# Patient Record
Sex: MALE | Race: WHITE | Employment: OTHER | URBAN - METROPOLITAN AREA
[De-identification: names, ages, dates, MRNs, and addresses within clinical notes are randomized per-mention and may not be internally consistent; named-entity substitution may affect disease eponyms.]

---

## 2018-08-10 ENCOUNTER — HOSPITAL ENCOUNTER (OUTPATIENT)
Dept: MRI IMAGING | Age: 78
Discharge: HOME | End: 2018-08-10
Attending: ORTHOPAEDIC SURGERY
Payer: MEDICARE

## 2018-08-10 DIAGNOSIS — M75.41 IMPINGEMENT SYNDROME OF RIGHT SHOULDER: ICD-10-CM

## 2018-08-10 PROCEDURE — 73221 MRI JOINT UPR EXTREM W/O DYE: CPT

## 2019-03-04 ENCOUNTER — HOSPITAL ENCOUNTER (OUTPATIENT)
Dept: PREADMISSION TESTING | Age: 79
Discharge: HOME | End: 2019-03-04
Payer: MEDICARE

## 2019-03-04 ENCOUNTER — HOSPITAL ENCOUNTER (OUTPATIENT)
Dept: NON INVASIVE DIAGNOSTICS | Age: 79
Discharge: HOME | End: 2019-03-04
Attending: ORTHOPAEDIC SURGERY
Payer: MEDICARE

## 2019-03-04 VITALS
BODY MASS INDEX: 29.14 KG/M2 | HEIGHT: 71 IN | WEIGHT: 208.11 LBS | DIASTOLIC BLOOD PRESSURE: 72 MMHG | RESPIRATION RATE: 17 BRPM | TEMPERATURE: 98.1 F | SYSTOLIC BLOOD PRESSURE: 138 MMHG | OXYGEN SATURATION: 97 %

## 2019-03-04 LAB
ABO + RH BLD: NORMAL
ALBUMIN SERPL-MCNC: 4 G/DL (ref 3.5–5)
ALBUMIN/GLOB SERPL: 1.5 {RATIO} (ref 1.1–2.2)
ALP SERPL-CCNC: 86 U/L (ref 45–117)
ALT SERPL-CCNC: 23 U/L (ref 12–78)
ANION GAP SERPL CALC-SCNC: 6 MMOL/L (ref 5–15)
APPEARANCE UR: CLEAR
APTT PPP: 27.5 SEC (ref 22.1–32)
AST SERPL-CCNC: 18 U/L (ref 15–37)
ATRIAL RATE: 576 BPM
BACTERIA URNS QL MICRO: NEGATIVE /HPF
BASOPHILS # BLD: 0 K/UL (ref 0–0.1)
BASOPHILS NFR BLD: 1 % (ref 0–1)
BILIRUB SERPL-MCNC: 0.5 MG/DL (ref 0.2–1)
BILIRUB UR QL: NEGATIVE
BLOOD GROUP ANTIBODIES SERPL: NORMAL
BUN SERPL-MCNC: 23 MG/DL (ref 6–20)
BUN/CREAT SERPL: 15 (ref 12–20)
CALCIUM SERPL-MCNC: 8.9 MG/DL (ref 8.5–10.1)
CALCULATED R AXIS, ECG10: 79 DEGREES
CALCULATED T AXIS, ECG11: 15 DEGREES
CHLORIDE SERPL-SCNC: 109 MMOL/L (ref 97–108)
CO2 SERPL-SCNC: 26 MMOL/L (ref 21–32)
COLOR UR: NORMAL
CREAT SERPL-MCNC: 1.57 MG/DL (ref 0.7–1.3)
DIAGNOSIS, 93000: NORMAL
DIFFERENTIAL METHOD BLD: NORMAL
EOSINOPHIL # BLD: 0.4 K/UL (ref 0–0.4)
EOSINOPHIL NFR BLD: 6 % (ref 0–7)
EPITH CASTS URNS QL MICRO: NORMAL /LPF
ERYTHROCYTE [DISTWIDTH] IN BLOOD BY AUTOMATED COUNT: 12.9 % (ref 11.5–14.5)
EST. AVERAGE GLUCOSE BLD GHB EST-MCNC: 108 MG/DL
GLOBULIN SER CALC-MCNC: 2.6 G/DL (ref 2–4)
GLUCOSE SERPL-MCNC: 88 MG/DL (ref 65–100)
GLUCOSE UR STRIP.AUTO-MCNC: NEGATIVE MG/DL
HBA1C MFR BLD: 5.4 % (ref 4.2–6.3)
HCT VFR BLD AUTO: 38.7 % (ref 36.6–50.3)
HGB BLD-MCNC: 12.8 G/DL (ref 12.1–17)
HGB UR QL STRIP: NEGATIVE
HYALINE CASTS URNS QL MICRO: NORMAL /LPF (ref 0–5)
IMM GRANULOCYTES # BLD AUTO: 0 K/UL (ref 0–0.04)
IMM GRANULOCYTES NFR BLD AUTO: 0 % (ref 0–0.5)
INR PPP: 1.1 (ref 0.9–1.1)
KETONES UR QL STRIP.AUTO: NEGATIVE MG/DL
LEUKOCYTE ESTERASE UR QL STRIP.AUTO: NEGATIVE
LYMPHOCYTES # BLD: 1.9 K/UL (ref 0.8–3.5)
LYMPHOCYTES NFR BLD: 30 % (ref 12–49)
MCH RBC QN AUTO: 29.9 PG (ref 26–34)
MCHC RBC AUTO-ENTMCNC: 33.1 G/DL (ref 30–36.5)
MCV RBC AUTO: 90.4 FL (ref 80–99)
MONOCYTES # BLD: 0.6 K/UL (ref 0–1)
MONOCYTES NFR BLD: 9 % (ref 5–13)
NEUTS SEG # BLD: 3.4 K/UL (ref 1.8–8)
NEUTS SEG NFR BLD: 55 % (ref 32–75)
NITRITE UR QL STRIP.AUTO: NEGATIVE
NRBC # BLD: 0 K/UL (ref 0–0.01)
NRBC BLD-RTO: 0 PER 100 WBC
P-R INTERVAL, ECG05: 312 MS
PH UR STRIP: 6 [PH] (ref 5–8)
PLATELET # BLD AUTO: 196 K/UL (ref 150–400)
PMV BLD AUTO: 10.2 FL (ref 8.9–12.9)
POTASSIUM SERPL-SCNC: 4.8 MMOL/L (ref 3.5–5.1)
PROT SERPL-MCNC: 6.6 G/DL (ref 6.4–8.2)
PROT UR STRIP-MCNC: NEGATIVE MG/DL
PROTHROMBIN TIME: 10.7 SEC (ref 9–11.1)
Q-T INTERVAL, ECG07: 412 MS
QRS DURATION, ECG06: 140 MS
QTC CALCULATION (BEZET), ECG08: 457 MS
RBC # BLD AUTO: 4.28 M/UL (ref 4.1–5.7)
RBC #/AREA URNS HPF: NORMAL /HPF (ref 0–5)
SODIUM SERPL-SCNC: 141 MMOL/L (ref 136–145)
SP GR UR REFRACTOMETRY: 1.02 (ref 1–1.03)
SPECIMEN EXP DATE BLD: NORMAL
THERAPEUTIC RANGE,PTTT: NORMAL SECS (ref 58–77)
UA: UC IF INDICATED,UAUC: NORMAL
UROBILINOGEN UR QL STRIP.AUTO: 1 EU/DL (ref 0.2–1)
VENTRICULAR RATE, ECG03: 74 BPM
WBC # BLD AUTO: 6.2 K/UL (ref 4.1–11.1)
WBC URNS QL MICRO: NORMAL /HPF (ref 0–4)

## 2019-03-04 PROCEDURE — 93005 ELECTROCARDIOGRAM TRACING: CPT

## 2019-03-04 PROCEDURE — 85025 COMPLETE CBC W/AUTO DIFF WBC: CPT

## 2019-03-04 PROCEDURE — 85730 THROMBOPLASTIN TIME PARTIAL: CPT

## 2019-03-04 PROCEDURE — 80053 COMPREHEN METABOLIC PANEL: CPT

## 2019-03-04 PROCEDURE — 36415 COLL VENOUS BLD VENIPUNCTURE: CPT

## 2019-03-04 PROCEDURE — 85610 PROTHROMBIN TIME: CPT

## 2019-03-04 PROCEDURE — 86900 BLOOD TYPING SEROLOGIC ABO: CPT

## 2019-03-04 PROCEDURE — 81001 URINALYSIS AUTO W/SCOPE: CPT

## 2019-03-04 PROCEDURE — 83036 HEMOGLOBIN GLYCOSYLATED A1C: CPT

## 2019-03-04 RX ORDER — HYDROCODONE BITARTRATE AND ACETAMINOPHEN 5; 325 MG/1; MG/1
1 TABLET ORAL
COMMUNITY
End: 2019-03-13

## 2019-03-04 RX ORDER — PYRIDOXINE HCL (VITAMIN B6) 100 MG
100 TABLET ORAL DAILY
COMMUNITY

## 2019-03-04 RX ORDER — ATORVASTATIN CALCIUM 40 MG/1
40 TABLET, FILM COATED ORAL
COMMUNITY

## 2019-03-04 RX ORDER — VALSARTAN 160 MG/1
160 TABLET ORAL DAILY
COMMUNITY

## 2019-03-04 RX ORDER — CARVEDILOL 3.12 MG/1
3.12 TABLET ORAL
COMMUNITY

## 2019-03-04 RX ORDER — ASPIRIN 325 MG
162.5 TABLET ORAL DAILY
COMMUNITY
End: 2019-03-13

## 2019-03-04 RX ORDER — ZOLPIDEM TARTRATE 10 MG/1
10 TABLET ORAL
COMMUNITY

## 2019-03-04 NOTE — PERIOP NOTES
[address] [place] [address] Dr [facility], [id number] [place] [address] MAIN OR                                  [phone] MAIN PRE OP                          [phone]                                                                                AMBULATORY PRE OP          [phone] PRE-ADMISSION TESTING    21  Surgery Date:   3/12/2019 Is surgery arrival time given by surgeon? NO If Elvis Alvarado staff will call you between 3 and 7pm the day before your surgery with your arrival time. (If your surgery is on a Monday, we will call you the Friday before.) Call [phone] after 7pm Monday-Friday if you did not receive your arrival time. INSTRUCTIONS BEFORE YOUR SURGERY When You 
Arrive Arrive at the 2nd [address] [facility] on the day of your surgery Have your insurance card, photo ID, and any copayment (if needed) Food 
 and  
Drink NO food or drink after midnight the night before surgery This means NO water, gum, mints, coffee, juice, etc. 
No alcohol (beer, wine, liquor) 24 hours before and after surgery Medications to TAKE Morning of Surgery MEDICATIONS TO TAKE THE MORNING OF SURGERY WITH A SIP OF WATER:  
? none Medications To 
STOP      7 days before surgery ? Non-Steroidal anti-inflammatory Drugs (NSAID's): for example, Ibuprofen (Advil, Motrin), Naproxen (Aleve) ? Aspirin, if taking for pain ? Herbal supplements, vitamins, and fish oil 
? Other: 
(Pain medications not listed above, including Tylenol may be taken) Blood Thinners ? If you take  Aspirin, Plavix, Coumadin, or any blood-thinning or anti-blood clot medicine, talk to the doctor who prescribed the medications for pre-operative instructions. ASK DR Eufemia Soria WHAT TO DO WITH YOUR ASA BEFORE SRUGERY Bathing Clothing Jewelry Valuables ?   If you shower the morning of surgery, please do not apply anything to your skin (lotions, powders, deodorant, or makeup, especially mascara) ? Follow all special bath instructions (for total joint replacement, spine and bowel surgeries) ? Do not shave or trim anywhere 24 hours before surgery ? Wear your hair loose or down; no pony-tails, buns, or metal hair clips ? Wear loose, comfortable, clean clothes ? Wear glasses instead of contacts ? Leave money, valuables, and jewelry, including body piercings, at home Going Home       or Spending the Night ? SAME-DAY SURGERY: You must have a responsible adult drive you home and stay with you 24 hours after surgery ? ADMITS: If your doctor is keeping you into the hospital after surgery, leave personal belongings/luggage in your car until you have a hospital room number. Hospital discharge time is 12 noon Drivers must be here before 12 noon unless you are told differently Special Instructions   Special Instructions: · Use Chlorhexidine Care Fusion wash and sponges 3 days prior to surgery as instructed. · Incentive spirometer given with instructions to practice at home and bring back to the hospital on the day of surgery. · [facility] will contact you if your Hemoglobin A1C is greater than 7.5. · Ensure/Glucerna  sample, nutritional information, and Ensure/Glucerna coupon given. · Pain pamphlet and Call Don't Fall reminder reviewed with patient. ·  parking is complimentary Monday - Friday 7 am - 5 pm 
· Bring PTA Medication list day of surgery with the last doses taken documented Follow all instructions so your surgery wont be cancelled. Please, be on time. If a situation occurs and you are delayed the day of surgery, call [phone]. If your physical condition changes (like a fever, cold, flu, etc.) call your surgeon. The patient was contacted  in person. Home medication reviewed and verified during PAT appointment. The patient verbalizes understanding of all instructions and does not  need reinforcement.

## 2019-03-04 NOTE — PERIOP NOTES
Phone call to Dr Jacque Pablo office to request ASA plan & clearance note Pacemaker plan faxed to Dr Jacque Pablo office Phone call to   [facility] office to request last office noted with [facility] for surgery Phone call to Dr Jennifer Christina office to request correct order for surgical procedure Pt stated he is having a right total knee replacement  Orders say left knee

## 2019-03-04 NOTE — PERIOP NOTES
Chlor-hex  wash  X 3 days before surgery reviewed, incentive spirometry/deep breathing, leg exercises to prevent DVT, s/s of infection all reviewed Pt verified understanding by teach back and return demonstration

## 2019-03-04 NOTE — PERIOP NOTES
Phone call:  HETALM for Edmond Bolden joint coordinator to get Mr Inna Smallwood scheduled into joint class for 3/6/2019   Pt will need called to confirm he his scheduled for this class

## 2019-03-05 LAB
BACTERIA SPEC CULT: NORMAL
BACTERIA SPEC CULT: NORMAL
SERVICE CMNT-IMP: NORMAL

## 2019-03-05 NOTE — PERIOP NOTES
Patient had requested to come to Joint Class on 2/6/19. Called patient to let him know he could come to class and patient stated he would not be able to make it.

## 2019-03-07 NOTE — PERIOP NOTES
Spoke with Dr. Rina Leal office. MD aware of pacer insertion on 2/14/19. New orders will be sent for RIGHT knee arthroplasty.

## 2019-03-08 NOTE — PERIOP NOTES
Call placed to Dr. Karla Núñez office to verify that pacemaker plan was received, pacemaker plan refaxed.

## 2019-03-11 ENCOUNTER — ANESTHESIA EVENT (OUTPATIENT)
Dept: SURGERY | Age: 79
DRG: 470 | End: 2019-03-11
Payer: MEDICARE

## 2019-03-11 NOTE — PERIOP NOTES
The corrected consent orders that were received from Dr. Margarito Majano office stated \"right total knee arthroscopy\" instead of \"arthroplasty. \"  Left a VM for Ericka Rowley at Dr. Margarito Majano office requesting corrected orders that state \"right total knee arthroplasty\" be faxed to PAT if before 1500 or to the Main preop if after 1500 today. Received the pacemaker plan and last cardiology OV note and placed on paper chart.   DOS: 3/12/2019

## 2019-03-12 ENCOUNTER — APPOINTMENT (OUTPATIENT)
Dept: GENERAL RADIOLOGY | Age: 79
DRG: 470 | End: 2019-03-12
Attending: ORTHOPAEDIC SURGERY
Payer: MEDICARE

## 2019-03-12 ENCOUNTER — HOSPITAL ENCOUNTER (INPATIENT)
Age: 79
LOS: 1 days | Discharge: HOME HEALTH | DRG: 470 | End: 2019-03-13
Attending: ORTHOPAEDIC SURGERY | Admitting: ORTHOPAEDIC SURGERY
Payer: MEDICARE

## 2019-03-12 ENCOUNTER — ANESTHESIA (OUTPATIENT)
Dept: SURGERY | Age: 79
DRG: 470 | End: 2019-03-12
Payer: MEDICARE

## 2019-03-12 DIAGNOSIS — M17.11 PRIMARY OSTEOARTHRITIS OF RIGHT KNEE: Primary | ICD-10-CM

## 2019-03-12 PROCEDURE — 76210000016 HC OR PH I REC 1 TO 1.5 HR: Performed by: ORTHOPAEDIC SURGERY

## 2019-03-12 PROCEDURE — 77030006835 HC BLD SAW SAG STRY -B: Performed by: ORTHOPAEDIC SURGERY

## 2019-03-12 PROCEDURE — 77030013708 HC HNDPC SUC IRR PULS STRY –B: Performed by: ORTHOPAEDIC SURGERY

## 2019-03-12 PROCEDURE — 77030020782 HC GWN BAIR PAWS FLX 3M -B

## 2019-03-12 PROCEDURE — 64447 NJX AA&/STRD FEMORAL NRV IMG: CPT | Performed by: ANESTHESIOLOGY

## 2019-03-12 PROCEDURE — 77030032490 HC SLV COMPR SCD KNE COVD -B

## 2019-03-12 PROCEDURE — 77030011640 HC PAD GRND REM COVD -A: Performed by: ORTHOPAEDIC SURGERY

## 2019-03-12 PROCEDURE — 76010000173 HC OR TIME 3 TO 3.5 HR INTENSV-TIER 1: Performed by: ORTHOPAEDIC SURGERY

## 2019-03-12 PROCEDURE — 74011250637 HC RX REV CODE- 250/637: Performed by: ANESTHESIOLOGY

## 2019-03-12 PROCEDURE — 77030036660

## 2019-03-12 PROCEDURE — 76060000037 HC ANESTHESIA 3 TO 3.5 HR: Performed by: ORTHOPAEDIC SURGERY

## 2019-03-12 PROCEDURE — 0SRC0J9 REPLACEMENT OF RIGHT KNEE JOINT WITH SYNTHETIC SUBSTITUTE, CEMENTED, OPEN APPROACH: ICD-10-PCS | Performed by: ORTHOPAEDIC SURGERY

## 2019-03-12 PROCEDURE — 77030018673: Performed by: ORTHOPAEDIC SURGERY

## 2019-03-12 PROCEDURE — 77030031139 HC SUT VCRL2 J&J -A: Performed by: ORTHOPAEDIC SURGERY

## 2019-03-12 PROCEDURE — 77030012935 HC DRSG AQUACEL BMS -B: Performed by: ORTHOPAEDIC SURGERY

## 2019-03-12 PROCEDURE — 74011250636 HC RX REV CODE- 250/636: Performed by: ORTHOPAEDIC SURGERY

## 2019-03-12 PROCEDURE — 77030021302 HC BUR RND FLUT BRSM -B: Performed by: ORTHOPAEDIC SURGERY

## 2019-03-12 PROCEDURE — 74011250637 HC RX REV CODE- 250/637: Performed by: ORTHOPAEDIC SURGERY

## 2019-03-12 PROCEDURE — 74011000250 HC RX REV CODE- 250

## 2019-03-12 PROCEDURE — 77030002933 HC SUT MCRYL J&J -A: Performed by: ORTHOPAEDIC SURGERY

## 2019-03-12 PROCEDURE — 77030010785: Performed by: ORTHOPAEDIC SURGERY

## 2019-03-12 PROCEDURE — 73560 X-RAY EXAM OF KNEE 1 OR 2: CPT

## 2019-03-12 PROCEDURE — 77030007866 HC KT SPN ANES BBMI -B: Performed by: ANESTHESIOLOGY

## 2019-03-12 PROCEDURE — 77010033678 HC OXYGEN DAILY

## 2019-03-12 PROCEDURE — 97116 GAIT TRAINING THERAPY: CPT

## 2019-03-12 PROCEDURE — 65660000000 HC RM CCU STEPDOWN

## 2019-03-12 PROCEDURE — 74011250636 HC RX REV CODE- 250/636: Performed by: ANESTHESIOLOGY

## 2019-03-12 PROCEDURE — 3E0T3BZ INTRODUCTION OF ANESTHETIC AGENT INTO PERIPHERAL NERVES AND PLEXI, PERCUTANEOUS APPROACH: ICD-10-PCS | Performed by: NURSE ANESTHETIST, CERTIFIED REGISTERED

## 2019-03-12 PROCEDURE — 77030003601 HC NDL NRV BLK BBMI -A: Performed by: ANESTHESIOLOGY

## 2019-03-12 PROCEDURE — 77030000032 HC CUF TRNQT ZIMM -B: Performed by: ORTHOPAEDIC SURGERY

## 2019-03-12 PROCEDURE — 74011000272 HC RX REV CODE- 272: Performed by: ORTHOPAEDIC SURGERY

## 2019-03-12 PROCEDURE — 74011250636 HC RX REV CODE- 250/636

## 2019-03-12 PROCEDURE — 77030033067 HC SUT PDO STRATFX SPIR J&J -B: Performed by: ORTHOPAEDIC SURGERY

## 2019-03-12 PROCEDURE — 74011000250 HC RX REV CODE- 250: Performed by: ORTHOPAEDIC SURGERY

## 2019-03-12 PROCEDURE — 77030039266 HC ADH SKN EXOFIN S2SG -A: Performed by: ORTHOPAEDIC SURGERY

## 2019-03-12 PROCEDURE — C1776 JOINT DEVICE (IMPLANTABLE): HCPCS | Performed by: ORTHOPAEDIC SURGERY

## 2019-03-12 PROCEDURE — C1713 ANCHOR/SCREW BN/BN,TIS/BN: HCPCS | Performed by: ORTHOPAEDIC SURGERY

## 2019-03-12 PROCEDURE — 97161 PT EVAL LOW COMPLEX 20 MIN: CPT

## 2019-03-12 PROCEDURE — 77030032490 HC SLV COMPR SCD KNE COVD -B: Performed by: ORTHOPAEDIC SURGERY

## 2019-03-12 PROCEDURE — 77030018836 HC SOL IRR NACL ICUM -A: Performed by: ORTHOPAEDIC SURGERY

## 2019-03-12 DEVICE — UNIVERSAL TIBIAL BASEPLATE
Type: IMPLANTABLE DEVICE | Site: KNEE | Status: FUNCTIONAL
Brand: TRIATHLON

## 2019-03-12 DEVICE — TIBIAL BEARING INSERT - CR
Type: IMPLANTABLE DEVICE | Site: KNEE | Status: FUNCTIONAL
Brand: TRIATHLON

## 2019-03-12 DEVICE — CRUCIATE RETAINING FEMORAL
Type: IMPLANTABLE DEVICE | Site: KNEE | Status: FUNCTIONAL
Brand: TRIATHLON

## 2019-03-12 DEVICE — CEMENT BNE 20ML 41GM FULL DOSE PMMA W/ TOBRA M VISC RADPQ: Type: IMPLANTABLE DEVICE | Site: KNEE | Status: FUNCTIONAL

## 2019-03-12 DEVICE — ASYMMETRIC PATELLA
Type: IMPLANTABLE DEVICE | Site: KNEE | Status: FUNCTIONAL
Brand: TRIATHLON

## 2019-03-12 DEVICE — COMPONENT KNEE CEM X3 TRIATHLON: Type: IMPLANTABLE DEVICE | Status: FUNCTIONAL

## 2019-03-12 RX ORDER — VANCOMYCIN HYDROCHLORIDE 1 G/20ML
INJECTION, POWDER, LYOPHILIZED, FOR SOLUTION INTRAVENOUS AS NEEDED
Status: DISCONTINUED | OUTPATIENT
Start: 2019-03-12 | End: 2019-03-12 | Stop reason: HOSPADM

## 2019-03-12 RX ORDER — PROPOFOL 10 MG/ML
INJECTION, EMULSION INTRAVENOUS AS NEEDED
Status: DISCONTINUED | OUTPATIENT
Start: 2019-03-12 | End: 2019-03-12 | Stop reason: HOSPADM

## 2019-03-12 RX ORDER — FLUMAZENIL 0.1 MG/ML
0.2 INJECTION INTRAVENOUS
Status: DISCONTINUED | OUTPATIENT
Start: 2019-03-12 | End: 2019-03-12 | Stop reason: HOSPADM

## 2019-03-12 RX ORDER — SODIUM CHLORIDE, SODIUM LACTATE, POTASSIUM CHLORIDE, CALCIUM CHLORIDE 600; 310; 30; 20 MG/100ML; MG/100ML; MG/100ML; MG/100ML
125 INJECTION, SOLUTION INTRAVENOUS CONTINUOUS
Status: DISCONTINUED | OUTPATIENT
Start: 2019-03-12 | End: 2019-03-12 | Stop reason: HOSPADM

## 2019-03-12 RX ORDER — AMOXICILLIN 250 MG
1 CAPSULE ORAL 2 TIMES DAILY
Status: DISCONTINUED | OUTPATIENT
Start: 2019-03-12 | End: 2019-03-13 | Stop reason: HOSPADM

## 2019-03-12 RX ORDER — FACIAL-BODY WIPES
10 EACH TOPICAL DAILY PRN
Status: DISCONTINUED | OUTPATIENT
Start: 2019-03-14 | End: 2019-03-13 | Stop reason: HOSPADM

## 2019-03-12 RX ORDER — SODIUM CHLORIDE 0.9 % (FLUSH) 0.9 %
5-40 SYRINGE (ML) INJECTION AS NEEDED
Status: DISCONTINUED | OUTPATIENT
Start: 2019-03-12 | End: 2019-03-13 | Stop reason: HOSPADM

## 2019-03-12 RX ORDER — MIDAZOLAM HYDROCHLORIDE 1 MG/ML
INJECTION, SOLUTION INTRAMUSCULAR; INTRAVENOUS AS NEEDED
Status: DISCONTINUED | OUTPATIENT
Start: 2019-03-12 | End: 2019-03-12 | Stop reason: HOSPADM

## 2019-03-12 RX ORDER — ASPIRIN 81 MG/1
81 TABLET ORAL 2 TIMES DAILY
Status: DISCONTINUED | OUTPATIENT
Start: 2019-03-12 | End: 2019-03-13 | Stop reason: HOSPADM

## 2019-03-12 RX ORDER — OXYCODONE HYDROCHLORIDE 5 MG/1
10 TABLET ORAL
Status: DISCONTINUED | OUTPATIENT
Start: 2019-03-12 | End: 2019-03-13 | Stop reason: HOSPADM

## 2019-03-12 RX ORDER — PROPOFOL 10 MG/ML
INJECTION, EMULSION INTRAVENOUS
Status: DISCONTINUED | OUTPATIENT
Start: 2019-03-12 | End: 2019-03-12 | Stop reason: HOSPADM

## 2019-03-12 RX ORDER — OXYCODONE HYDROCHLORIDE 5 MG/1
5 TABLET ORAL
Status: DISCONTINUED | OUTPATIENT
Start: 2019-03-12 | End: 2019-03-13 | Stop reason: HOSPADM

## 2019-03-12 RX ORDER — ZOLPIDEM TARTRATE 5 MG/1
5 TABLET ORAL
Status: DISCONTINUED | OUTPATIENT
Start: 2019-03-12 | End: 2019-03-13 | Stop reason: HOSPADM

## 2019-03-12 RX ORDER — POLYETHYLENE GLYCOL 3350 17 G/17G
17 POWDER, FOR SOLUTION ORAL DAILY
Status: DISCONTINUED | OUTPATIENT
Start: 2019-03-13 | End: 2019-03-13 | Stop reason: HOSPADM

## 2019-03-12 RX ORDER — ROPIVACAINE HYDROCHLORIDE 5 MG/ML
INJECTION, SOLUTION EPIDURAL; INFILTRATION; PERINEURAL AS NEEDED
Status: DISCONTINUED | OUTPATIENT
Start: 2019-03-12 | End: 2019-03-12 | Stop reason: HOSPADM

## 2019-03-12 RX ORDER — POVIDONE-IODINE 10 %
SOLUTION, NON-ORAL TOPICAL AS NEEDED
Status: DISCONTINUED | OUTPATIENT
Start: 2019-03-12 | End: 2019-03-12 | Stop reason: HOSPADM

## 2019-03-12 RX ORDER — DEXAMETHASONE SODIUM PHOSPHATE 4 MG/ML
4 INJECTION, SOLUTION INTRA-ARTICULAR; INTRALESIONAL; INTRAMUSCULAR; INTRAVENOUS; SOFT TISSUE EVERY 6 HOURS
Status: DISPENSED | OUTPATIENT
Start: 2019-03-12 | End: 2019-03-13

## 2019-03-12 RX ORDER — DIPHENHYDRAMINE HYDROCHLORIDE 50 MG/ML
12.5 INJECTION, SOLUTION INTRAMUSCULAR; INTRAVENOUS
Status: ACTIVE | OUTPATIENT
Start: 2019-03-12 | End: 2019-03-13

## 2019-03-12 RX ORDER — HYDROMORPHONE HYDROCHLORIDE 2 MG/ML
0.5 INJECTION, SOLUTION INTRAMUSCULAR; INTRAVENOUS; SUBCUTANEOUS
Status: ACTIVE | OUTPATIENT
Start: 2019-03-12 | End: 2019-03-13

## 2019-03-12 RX ORDER — ACETAMINOPHEN 325 MG/1
650 TABLET ORAL EVERY 6 HOURS
Status: DISCONTINUED | OUTPATIENT
Start: 2019-03-12 | End: 2019-03-13 | Stop reason: HOSPADM

## 2019-03-12 RX ORDER — LANOLIN ALCOHOL/MO/W.PET/CERES
100 CREAM (GRAM) TOPICAL DAILY
Status: DISCONTINUED | OUTPATIENT
Start: 2019-03-13 | End: 2019-03-13 | Stop reason: HOSPADM

## 2019-03-12 RX ORDER — CEFAZOLIN SODIUM/WATER 2 G/20 ML
2 SYRINGE (ML) INTRAVENOUS ONCE
Status: COMPLETED | OUTPATIENT
Start: 2019-03-12 | End: 2019-03-12

## 2019-03-12 RX ORDER — DIPHENHYDRAMINE HYDROCHLORIDE 50 MG/ML
12.5 INJECTION, SOLUTION INTRAMUSCULAR; INTRAVENOUS AS NEEDED
Status: DISCONTINUED | OUTPATIENT
Start: 2019-03-12 | End: 2019-03-12 | Stop reason: HOSPADM

## 2019-03-12 RX ORDER — LIDOCAINE HYDROCHLORIDE 10 MG/ML
0.1 INJECTION, SOLUTION EPIDURAL; INFILTRATION; INTRACAUDAL; PERINEURAL AS NEEDED
Status: DISCONTINUED | OUTPATIENT
Start: 2019-03-12 | End: 2019-03-12 | Stop reason: HOSPADM

## 2019-03-12 RX ORDER — FENTANYL CITRATE 50 UG/ML
INJECTION, SOLUTION INTRAMUSCULAR; INTRAVENOUS AS NEEDED
Status: DISCONTINUED | OUTPATIENT
Start: 2019-03-12 | End: 2019-03-12 | Stop reason: HOSPADM

## 2019-03-12 RX ORDER — BUPIVACAINE HYDROCHLORIDE 5 MG/ML
INJECTION, SOLUTION EPIDURAL; INTRACAUDAL AS NEEDED
Status: DISCONTINUED | OUTPATIENT
Start: 2019-03-12 | End: 2019-03-12 | Stop reason: HOSPADM

## 2019-03-12 RX ORDER — OXYCODONE HYDROCHLORIDE 5 MG/1
5 TABLET ORAL
Status: DISCONTINUED | OUTPATIENT
Start: 2019-03-12 | End: 2019-03-12 | Stop reason: HOSPADM

## 2019-03-12 RX ORDER — SODIUM CHLORIDE 0.9 % (FLUSH) 0.9 %
5-40 SYRINGE (ML) INJECTION EVERY 8 HOURS
Status: DISCONTINUED | OUTPATIENT
Start: 2019-03-12 | End: 2019-03-13 | Stop reason: HOSPADM

## 2019-03-12 RX ORDER — HYDROMORPHONE HYDROCHLORIDE 1 MG/ML
.25-1 INJECTION, SOLUTION INTRAMUSCULAR; INTRAVENOUS; SUBCUTANEOUS
Status: DISCONTINUED | OUTPATIENT
Start: 2019-03-12 | End: 2019-03-12 | Stop reason: HOSPADM

## 2019-03-12 RX ORDER — ATORVASTATIN CALCIUM 20 MG/1
40 TABLET, FILM COATED ORAL
Status: DISCONTINUED | OUTPATIENT
Start: 2019-03-12 | End: 2019-03-13 | Stop reason: HOSPADM

## 2019-03-12 RX ORDER — LABETALOL HYDROCHLORIDE 5 MG/ML
INJECTION, SOLUTION INTRAVENOUS AS NEEDED
Status: DISCONTINUED | OUTPATIENT
Start: 2019-03-12 | End: 2019-03-12 | Stop reason: HOSPADM

## 2019-03-12 RX ORDER — CARVEDILOL 3.12 MG/1
3.12 TABLET ORAL
Status: DISCONTINUED | OUTPATIENT
Start: 2019-03-12 | End: 2019-03-13 | Stop reason: HOSPADM

## 2019-03-12 RX ORDER — CEFAZOLIN SODIUM/WATER 2 G/20 ML
2 SYRINGE (ML) INTRAVENOUS EVERY 8 HOURS
Status: COMPLETED | OUTPATIENT
Start: 2019-03-12 | End: 2019-03-13

## 2019-03-12 RX ORDER — SODIUM CHLORIDE 9 MG/ML
125 INJECTION, SOLUTION INTRAVENOUS CONTINUOUS
Status: DISPENSED | OUTPATIENT
Start: 2019-03-12 | End: 2019-03-13

## 2019-03-12 RX ORDER — ONDANSETRON 2 MG/ML
4 INJECTION INTRAMUSCULAR; INTRAVENOUS
Status: ACTIVE | OUTPATIENT
Start: 2019-03-12 | End: 2019-03-13

## 2019-03-12 RX ORDER — NALOXONE HYDROCHLORIDE 0.4 MG/ML
0.2 INJECTION, SOLUTION INTRAMUSCULAR; INTRAVENOUS; SUBCUTANEOUS
Status: DISCONTINUED | OUTPATIENT
Start: 2019-03-12 | End: 2019-03-12 | Stop reason: HOSPADM

## 2019-03-12 RX ORDER — ACETAMINOPHEN 325 MG/1
975 TABLET ORAL ONCE
Status: COMPLETED | OUTPATIENT
Start: 2019-03-12 | End: 2019-03-12

## 2019-03-12 RX ORDER — NALOXONE HYDROCHLORIDE 0.4 MG/ML
0.4 INJECTION, SOLUTION INTRAMUSCULAR; INTRAVENOUS; SUBCUTANEOUS AS NEEDED
Status: DISCONTINUED | OUTPATIENT
Start: 2019-03-12 | End: 2019-03-13 | Stop reason: HOSPADM

## 2019-03-12 RX ORDER — FAMOTIDINE 20 MG/1
20 TABLET, FILM COATED ORAL 2 TIMES DAILY
Status: DISCONTINUED | OUTPATIENT
Start: 2019-03-12 | End: 2019-03-13 | Stop reason: HOSPADM

## 2019-03-12 RX ORDER — FENTANYL CITRATE 50 UG/ML
25 INJECTION, SOLUTION INTRAMUSCULAR; INTRAVENOUS
Status: DISCONTINUED | OUTPATIENT
Start: 2019-03-12 | End: 2019-03-12 | Stop reason: HOSPADM

## 2019-03-12 RX ADMIN — FAMOTIDINE 20 MG: 20 TABLET ORAL at 15:28

## 2019-03-12 RX ADMIN — LABETALOL HYDROCHLORIDE 10 MG: 5 INJECTION, SOLUTION INTRAVENOUS at 08:29

## 2019-03-12 RX ADMIN — OXYCODONE HYDROCHLORIDE 10 MG: 5 TABLET ORAL at 22:56

## 2019-03-12 RX ADMIN — MIDAZOLAM HYDROCHLORIDE 2 MG: 1 INJECTION, SOLUTION INTRAMUSCULAR; INTRAVENOUS at 07:08

## 2019-03-12 RX ADMIN — MIDAZOLAM HYDROCHLORIDE 2 MG: 1 INJECTION, SOLUTION INTRAMUSCULAR; INTRAVENOUS at 07:35

## 2019-03-12 RX ADMIN — ATORVASTATIN CALCIUM 40 MG: 20 TABLET, FILM COATED ORAL at 21:14

## 2019-03-12 RX ADMIN — LABETALOL HYDROCHLORIDE 5 MG: 5 INJECTION, SOLUTION INTRAVENOUS at 08:55

## 2019-03-12 RX ADMIN — DEXAMETHASONE SODIUM PHOSPHATE 4 MG: 4 INJECTION, SOLUTION INTRAMUSCULAR; INTRAVENOUS at 15:29

## 2019-03-12 RX ADMIN — MIDAZOLAM HYDROCHLORIDE 1 MG: 1 INJECTION, SOLUTION INTRAMUSCULAR; INTRAVENOUS at 07:10

## 2019-03-12 RX ADMIN — ROPIVACAINE HYDROCHLORIDE 30 ML: 5 INJECTION, SOLUTION EPIDURAL; INFILTRATION; PERINEURAL at 07:13

## 2019-03-12 RX ADMIN — PROPOFOL 75 MCG/KG/MIN: 10 INJECTION, EMULSION INTRAVENOUS at 07:30

## 2019-03-12 RX ADMIN — SENNOSIDES AND DOCUSATE SODIUM 1 TABLET: 8.6; 5 TABLET ORAL at 15:28

## 2019-03-12 RX ADMIN — SODIUM CHLORIDE, SODIUM LACTATE, POTASSIUM CHLORIDE, AND CALCIUM CHLORIDE: 600; 310; 30; 20 INJECTION, SOLUTION INTRAVENOUS at 08:45

## 2019-03-12 RX ADMIN — ACETAMINOPHEN 650 MG: 325 TABLET ORAL at 22:56

## 2019-03-12 RX ADMIN — Medication 2 G: at 15:28

## 2019-03-12 RX ADMIN — HYDROMORPHONE HYDROCHLORIDE 0.5 MG: 1 INJECTION, SOLUTION INTRAMUSCULAR; INTRAVENOUS; SUBCUTANEOUS at 11:16

## 2019-03-12 RX ADMIN — ACETAMINOPHEN 650 MG: 325 TABLET ORAL at 15:28

## 2019-03-12 RX ADMIN — CARVEDILOL 3.12 MG: 3.12 TABLET, FILM COATED ORAL at 21:14

## 2019-03-12 RX ADMIN — SODIUM CHLORIDE, SODIUM LACTATE, POTASSIUM CHLORIDE, AND CALCIUM CHLORIDE 125 ML/HR: 600; 310; 30; 20 INJECTION, SOLUTION INTRAVENOUS at 07:04

## 2019-03-12 RX ADMIN — Medication 2 G: at 21:15

## 2019-03-12 RX ADMIN — Medication 10 ML: at 14:00

## 2019-03-12 RX ADMIN — FENTANYL CITRATE 50 MCG: 50 INJECTION, SOLUTION INTRAMUSCULAR; INTRAVENOUS at 07:08

## 2019-03-12 RX ADMIN — PROPOFOL 20 MG: 10 INJECTION, EMULSION INTRAVENOUS at 07:34

## 2019-03-12 RX ADMIN — ASPIRIN 81 MG: 81 TABLET ORAL at 15:28

## 2019-03-12 RX ADMIN — HYDROMORPHONE HYDROCHLORIDE 0.5 MG: 1 INJECTION, SOLUTION INTRAMUSCULAR; INTRAVENOUS; SUBCUTANEOUS at 10:50

## 2019-03-12 RX ADMIN — Medication 2 G: at 07:36

## 2019-03-12 RX ADMIN — BUPIVACAINE HYDROCHLORIDE 2.2 ML: 5 INJECTION, SOLUTION EPIDURAL; INTRACAUDAL at 07:24

## 2019-03-12 RX ADMIN — DEXAMETHASONE SODIUM PHOSPHATE 4 MG: 4 INJECTION, SOLUTION INTRAMUSCULAR; INTRAVENOUS at 22:56

## 2019-03-12 RX ADMIN — FENTANYL CITRATE 50 MCG: 50 INJECTION, SOLUTION INTRAMUSCULAR; INTRAVENOUS at 08:12

## 2019-03-12 RX ADMIN — LABETALOL HYDROCHLORIDE 5 MG: 5 INJECTION, SOLUTION INTRAVENOUS at 08:11

## 2019-03-12 RX ADMIN — ACETAMINOPHEN 975 MG: 325 TABLET ORAL at 06:24

## 2019-03-12 RX ADMIN — OXYCODONE HYDROCHLORIDE 5 MG: 5 TABLET ORAL at 15:29

## 2019-03-12 NOTE — ANESTHESIA PROCEDURE NOTES
Peripheral Block    Start time: 3/12/2019 7:08 AM  End time: 3/12/2019 7:13 AM  Performed by: Nick Casas CRNA  Authorized by: Diane Moore MD       Pre-procedure: Indications: at surgeon's request and post-op pain management    Preanesthetic Checklist: patient identified, risks and benefits discussed, site marked, timeout performed, anesthesia consent given and patient being monitored    Timeout Time: 07:08          Block Type:   Block Type: Adductor canal  Laterality:  Right  Monitoring:  Continuous pulse ox, frequent vital sign checks, heart rate, responsive to questions and oxygen  Injection Technique:  Single shot  Procedures: ultrasound guided    Patient Position: supine  Prep: chlorhexidine    Needle Type:  Stimuplex  Needle Gauge:  21 G  Needle Localization:  Ultrasound guidance    Assessment:  Number of attempts:  1  Injection Assessment:  Incremental injection every 5 mL, local visualized surrounding nerve on ultrasound, negative aspiration for blood, no paresthesia and no intravascular symptoms  Patient tolerance:  Patient tolerated the procedure well with no immediate complications  Sciatic block performed with nerve stimulation and landmark identification. Needle used was 4\" stimuplex 21g. 20cc 0.2% ropivacaine injected intermittently with negative aspiration.

## 2019-03-12 NOTE — PROGRESS NOTES
Date of Surgery Update:  Malva Kanner was seen and examined. History and physical has been reviewed. The patient has been examined. There have been no significant clinical changes since the completion of the originally dated History and Physical.  Patient identified by surgeon; surgical site was confirmed by patient and surgeon. We will plan to admit the patient post op with telemetry monitoring for a likely 2 midnight admission after his recent pacemaker placement.      Signed By: Blake Zimmer MD     March 12, 2019 7:12 AM

## 2019-03-12 NOTE — PROGRESS NOTES
3/12/2019 5:36 PM Case management consult for discharge planning received. Met with pt. Charted address and phone number confirmed. Reason for Admission:  Right total knee arthroplasty with Dr. Bharath Rivero Score: 10                    Plan for utilizing home health:  Yes, choice given, [facility] [address] selected as preference. Referral sent to [facility] [address] via [employer]. Likelihood of Readmission: low                          Transition of Care Plan:  Home with family and home health    Pt lives with his wife in a 2 story home in [place]. Pt has a cane at home, choice given for rw dme agency, [facility] selected as preference. Rw order sent to [facility] via [employer]. Pt has rx coverage and fills his scripts at the [facility] [address]. Pt's wife will transport pt home. Pt was independent with adls and driving prior to admission. CM will follow.  REBECCA Zaidi  Care Management Interventions  PCP Verified by CM: Yes(Param Li, no nurse navigator )  Transition of Care Consult (CM Consult): Discharge Planning  MyChart Signup: No  Discharge Durable Medical Equipment: No  Physical Therapy Consult: Yes  Occupational Therapy Consult: Yes  Speech Therapy Consult: No  Current Support Network: Lives with Spouse, Own Home

## 2019-03-12 NOTE — ANESTHESIA POSTPROCEDURE EVALUATION
Procedure(s):  RIGHT CONVERSION TOTAL KNEE ARTHROPLASTY.     Anesthesia Post Evaluation        Patient location during evaluation: PACU  Level of consciousness: awake  Pain management: adequate  Airway patency: patent  Anesthetic complications: no  Cardiovascular status: acceptable  Respiratory status: acceptable  Hydration status: acceptable        Visit Vitals  /75   Pulse 78   Temp 36.5 °C (97.7 °F)   Resp 18   SpO2 95%

## 2019-03-12 NOTE — BRIEF OP NOTE
BRIEF OPERATIVE NOTE    Date of Procedure: 3/12/2019   Preoperative Diagnosis: Osteoarthritis of right knee  Postoperative Diagnosis: Osteoarthritis of right knee    Procedure(s):  RIGHT CONVERSION TOTAL KNEE ARTHROPLASTY  Surgeon(s) and Role:     Marisol Vasquez MD - Primary         Surgical Assistant: Almas Guzman    Surgical Staff:  Circ-1: Maru Galo RN  Circ-Relief: Gloria Melendez, Jose Eduardo Britt RN  Scrub Tech-1: Benji Peres  Scrub Tech-Relief: Leopoldo Lederer  Surg Asst-1: Lobojulien Roberson  Surg Asst-2: Lindsey Severance  Event Time In Time Out   Incision Start 8442    Incision Close 1028      Anesthesia: Other   Estimated Blood Loss: 150  Specimens: * No specimens in log *   Findings: Stable medial uni, end stage PF OA and lateral OA   Complications: none immediate  Implants:   Implant Name Type Inv.  Item Serial No.  Lot No. LRB No. Used Action   CEMENT BNE SIMPLEX TOBRA 4 --  - SN/A Cement CEMENT BNE SIMPLEX TOBRA 4 --  N/A [employer] HOW [id number] Right 2 Implanted   PAT ASYM TRIATHLN X3 [id number] -- TRIATHLON ASYMMETRIC X3 - SN/A Joint Component PAT ASYM TRIATHLN X3 [id number] -- TRIATHLON ASYMMETRIC X3 N/A [employer] HOW [id number] Right 1 Implanted   FEM KNE CATA CR SZ 5 RT -- TRIATHLON - SN/A Joint Component FEM KNE CATA CR SZ 5 RT -- TRIATHLON N/A [employer] HOW [id number] Right 1 Implanted   BASEPLT TIB UNIV TRIATHLN 5 --  - SN/A Joint Component BASEPLT TIB UNIV TRIATHLN 5 --  N/A [employer] HOW [id number] Right 1 Implanted   INSERT TIB ARTC CRUC SZ5 11MM --  - SN/A Joint Component INSERT TIB ARTC CRUC SZ5 11MM --  N/A [employer] HOW [id number] Right 1 Implanted

## 2019-03-12 NOTE — PROGRESS NOTES
Problem: Mobility Impaired (Adult and Pediatric)  Goal: *Acute Goals and Plan of Care (Insert Text)  Physical Therapy Goals  Initiated 3/12/2019    1. Patient will move from supine to sit and sit to supine  in bed with independence within 4 days. 2. Patient will perform sit to stand with modified independence within 4 days. 3. Patient will ambulate with modified independence for 100 feet with the least restrictive device within 4 days. 4. Patient will ascend/descend 4 stairs with 2 handrail(s) with minimal assistance/contact guard assist within 4 days. 5. Patient will perform home exercise program per protocol with independence within 4 days. 6. Patient will demonstrate AROM 0-90 degrees in operative joint within 4 days. physical Therapy knee EVALUATION  Patient: Jose Camacho (75 y.o. male)  Date: 3/12/2019  Primary Diagnosis: Osteoarthritis of right knee, unspecified osteoarthritis type [M17.11]  Osteoarthritis of right knee [M17.11]  Osteoarthritis of right knee, unspecified osteoarthritis type [M17.11]  Procedure(s) (LRB):  RIGHT CONVERSION TOTAL KNEE ARTHROPLASTY (Right) Day of Surgery   Precautions:   Fall, WBAT(no flexion to operative knee >90 degrees)    ASSESSMENT :  Based on the objective data described below, the patient presents with decreased ROM and strength to RLE, decreased bed mobility, transfers and gait following admission for conversion of right [place] to right TKA. Patient has history of bilateral [place] in 2007. Also has extensive cardiac history of CABG, stents and most recently was pacemaker insertion 2/14/19 . Today patient was received alert and ready to work with PT. PT educated him regarding knee exercises, WBAT and no knee flexion > 90 degrees to operative knee. He expressed understanding and handout was provided. Patient is able to do SLR with supervision/ CGA. He stood and ambulated 8 feet with rolling walker +2 min assist. Vitals stable.     Patient lives with his wife in a two story home. He will need a rolling walker and HHPT upon discharge. Patient will benefit from skilled intervention to address the above impairments. Patients rehabilitation potential is considered to be Good  Factors which may influence rehabilitation potential include:   []         None noted  []         Mental ability/status  [x]         Medical condition  []         Home/family situation and support systems  []         Safety awareness  []         Pain tolerance/management  []         Other:      PLAN :  Recommendations and Planned Interventions:  [x]           Bed Mobility Training             []    Neuromuscular Re-Education  [x]           Transfer Training                   []    Orthotic/Prosthetic Training  [x]           Gait Training                         []    Modalities  [x]           Therapeutic Exercises           []    Edema Management/Control  []           Therapeutic Activities            []    Patient and Family Training/Education  []           Other (comment):    Frequency/Duration: Patient will be followed by physical therapy twice daily to address goals. Discharge Recommendations: Home Health  Further Equipment Recommendations for Discharge: rolling walker     SUBJECTIVE:   Patient stated I can't believe how good I feel.     OBJECTIVE DATA SUMMARY:   HISTORY:    Past Medical History:   Diagnosis Date    Arrhythmia     hx afib    Arthritis     Cancer ([facility] [address]     basal & squamous cell removed   chest & arms    Chronic pain     neck & shoulders    Coronary atherosclerosis of native coronary artery 2/15/2011    2 sets stents & CABG    Essential hypertension, benign 2/15/2011    GERD (gastroesophageal reflux disease)     Heart failure ([facility] [address] [facility]     Mixed hyperlipidemia 2/15/2011    Renal insufficiency 03/04/2019    Cr 1.57     Past Surgical History:   Procedure Laterality Date    ABDOMEN SURGERY PROC UNLISTED  2009    left inguinal hernia repair    CARDIAC SURG PROCEDURE UNLIST  2009    CABG  CARDIAC SURG PROCEDURE UNLIST  2003    stents x 4    CARDIAC SURG PROCEDURE UNLIST  2007    stents x 4    CARDIAC SURG PROCEDURE UNLIST  02/2018    ablation    CARDIAC SURG PROCEDURE UNLIST  06/2018    ablation for afib    HX HEENT  02/2018    cataract surgery bilat c no lens implnts    HX HEENT  03/2018    laser surgery to correct visual problems after caratact surgery    HX ORTHOPAEDIC Bilateral 2007    partial knee replacemets bilat    HX PACEMAKER  02/14/2019    pacemaker inserted    HX UROLOGICAL      vasectomy     Prior Level of Function/Home Situation: independent; likes to play golf  Personal factors and/or comorbidities impacting plan of care: medical history    Home Situation  Home Environment: Private residence  # Steps to Enter: 0  One/Two Story Residence: Two story  # of Interior Steps: 12  Interior Rails: Both  Lift Chair Available: No  Living Alone: No  Support Systems: Spouse/Significant Other/Partner  Patient Expects to be Discharged to[de-identified] Private residence  Current DME Used/Available at Home: None    EXAMINATION/PRESENTATION/DECISION MAKING:   Critical Behavior:  Neurologic State: Alert  Orientation Level: Oriented X4  Cognition: Follows commands  Safety/Judgement: Good awareness of safety precautions  Hearing: Auditory  Auditory Impairment: Hard of hearing, bilateral, Hearing aid(s)(at home)  Hearing Aids/Status: At home  Skin:  Not fully observed  Range Of Motion:  AROM: Within functional limits              RLE AROM  R Knee Flexion: 90  R Knee Extension: 12        Strength:    Strength:  Within functional limits(RLE slightly less due to surgery)                    Tone & Sensation:   Tone: Normal              Sensation: Intact                       Functional Mobility:  Bed Mobility:     Supine to Sit: Modified independent  Sit to Supine: Modified independent  Scooting: Modified independent  Transfers:  Sit to Stand: Minimum assistance;Assist x2  Stand to Sit: Contact guard assistance;Assist x2                       Balance:   Sitting: Intact  Standing: Intact; With support  Ambulation/Gait Training:  Distance (ft): 8 Feet (ft)  Assistive Device: Gait belt;Walker, rolling  Ambulation - Level of Assistance: Minimal assistance;Assist x2        Gait Abnormalities: Step to gait  Right Side Weight Bearing: As tolerated                                                Therapeutic Exercises: Ankle pumps, quad and heel sets, heel slides, SLR    Functional Measure:  Barthel Index:    Barthel Index:    Bathin  Bladder: 10  Bowels: 10  Groomin  Dressin  Feeding: 10  Mobility: 0  Stairs: 0  Toilet Use: 5  Transfer (Bed to Chair and Back): 5  Total: 50/100       Percentage of impairment   0%   1-19%   20-39%   40-59%   60-79%   80-99%   100%   Barthel Score 0-100 100 99-80 79-60 59-40 20-39 1-19   0     The Barthel ADL Index: Guidelines  1. The index should be used as a record of what a patient does, not as a record of what a patient could do. 2. The main aim is to establish degree of independence from any help, physical or verbal, however minor and for whatever reason. 3. The need for supervision renders the patient not independent. 4. A patient's performance should be established using the best available evidence. Asking the patient, friends/relatives and nurses are the usual sources, but direct observation and common sense are also important. However direct testing is not needed. 5. Usually the patient's performance over the preceding 24-48 hours is important, but occasionally longer periods will be relevant. 6. Middle categories imply that the patient supplies over 50 per cent of the effort. 7. Use of aids to be independent is allowed. Marybeth Turner., Barthel, D.W. (4725). Functional evaluation: the Barthel Index. [address] [facility] (14)2. JAZZ Fortune, Yolis Pineda., Noemi Mabry., Sindhu, [address] ().  Measuring the change indisability after inpatient rehabilitation; comparison of the responsiveness of the Barthel Index and Functional [place] Measure. Journal of Neurology, Neurosurgery, and Psychiatry, 66(4), 394-303. ELSA Pena, IRENE Lopez, & Kin Mcconnell M.A. (2004.) Assessment of post-stroke quality of life in cost-effectiveness studies: The usefulness of the Barthel Index and the EuroQoL-5D. Quality of Life Research, 15, 274-53            Physical Therapy Evaluation Charge Determination   History Examination Presentation Decision-Making   MEDIUM  Complexity : 1-2 comorbidities / personal factors will impact the outcome/ POC  LOW Complexity : 1-2 Standardized tests and measures addressing body structure, function, activity limitation and / or participation in recreation  LOW Complexity : Stable, uncomplicated  Other outcome measures Barthel  LOW       Based on the above components, the patient evaluation is determined to be of the following complexity level: LOW     Pain:  Pain Scale 1: Numeric (0 - 10)  Pain Intensity 1: 3  Pain Location 1: right knee  Activity Tolerance:   good  Please refer to the flowsheet for vital signs taken during this treatment. After treatment:   []         Patient left in no apparent distress sitting up in chair  [x]         Patient left in no apparent distress in bed  [x]         Call bell left within reach  [x]         Nursing notified  []         Caregiver present  [x]         Bed alarm activated    COMMUNICATION/EDUCATION:   The patients plan of care was discussed with: Registered Nurse. [x]         Fall prevention education was provided and the patient/caregiver indicated understanding. []         Patient/family have participated as able in goal setting and plan of care. [x]         Patient/family agree to work toward stated goals and plan of care. []         Patient understands intent and goals of therapy, but is neutral about his/her participation.   []         Patient is unable to participate in goal setting and plan of care.     Thank you for this referral.  Meka Mejia, PT   Time Calculation: 35 mins

## 2019-03-12 NOTE — ANESTHESIA PREPROCEDURE EVALUATION
Anesthetic History   No history of anesthetic complications            Review of Systems / Medical History  Patient summary reviewed, nursing notes reviewed and pertinent labs reviewed    Pulmonary  Within defined limits                 Neuro/Psych   Within defined limits           Cardiovascular    Hypertension      CHF    Pacemaker, CAD, cardiac stents (2003, 2007), CABG (2009) and hyperlipidemia    Exercise tolerance: >4 METS  Comments: EF 45% by echo 2018  Cath 2017, patent vein grafts   GI/Hepatic/Renal         Renal disease: CRI       Endo/Other        Arthritis     Other Findings            Physical Exam    Airway  Mallampati: I  TM Distance: 4 - 6 cm  Neck ROM: normal range of motion   Mouth opening: Normal     Cardiovascular    Rhythm: regular  Rate: normal         Dental    Dentition: Lower dentition intact and Upper dentition intact     Pulmonary  Breath sounds clear to auscultation               Abdominal         Other Findings            Anesthetic Plan    ASA: 3  Anesthesia type: spinal      Post-op pain plan if not by surgeon: peripheral nerve block single      Anesthetic plan and risks discussed with: Patient

## 2019-03-12 NOTE — PERIOP NOTES
TRANSFER - OUT REPORT:    Verbal report given to RN Miguel Angel Hernandez  being transferred to [facility](unit) for routine post - op       Report consisted of patients Situation, Background, Assessment and   Recommendations(SBAR). Information from the following report(s) SBAR, OR Summary, Procedure Summary, Intake/Output, Cardiac Rhythm paced and Quality Measures was reviewed with the receiving nurse. Lines:   Peripheral IV 03/12/19 Right Forearm (Active)   Site Assessment Clean, dry, & intact 3/12/2019 12:00 PM   Phlebitis Assessment 0 3/12/2019 12:00 PM   Infiltration Assessment 0 3/12/2019 12:00 PM   Dressing Status Clean, dry, & intact; Occlusive 3/12/2019 12:00 PM   Dressing Type Tape;Transparent 3/12/2019 12:00 PM   Hub Color/Line Status Pink; Infusing 3/12/2019 12:00 PM   Alcohol Cap Used Yes 3/12/2019 12:00 PM        Opportunity for questions and clarification was provided.       Patient transported with:   Monitor  Registered Nurse

## 2019-03-12 NOTE — ANESTHESIA PROCEDURE NOTES
Spinal Block    Start time: 3/12/2019 7:16 AM  End time: 3/12/2019 7:24 AM  Performed by: Kristine Pineda CRNA  Authorized by: Aime Rivera MD     Pre-procedure:   Indications: at surgeon's request and primary anesthetic  Preanesthetic Checklist: patient identified, risks and benefits discussed, anesthesia consent, site marked, patient being monitored and timeout performed    Timeout Time: 07:08          Spinal Block:   Patient Position:  Seated  Prep Region:  Lumbar  Prep: chlorhexidine      Location:  L3-4  Technique:  Single shot        Needle:   Needle Type:  Quincke  Needle Gauge:  22 G  Attempts:  2      Events: CSF confirmed, no blood with aspiration and no paresthesia        Assessment:  Insertion:  Uncomplicated  Patient tolerance:  Patient tolerated the procedure well with no immediate complications

## 2019-03-12 NOTE — PROGRESS NOTES
Occupational therapy note:  OT order received. OT POC begins for this patient on POD 1. Will see patient tomorrow for OT evaluation. Brigitte Jiang MS OTR/L

## 2019-03-13 VITALS
DIASTOLIC BLOOD PRESSURE: 74 MMHG | BODY MASS INDEX: 29.44 KG/M2 | SYSTOLIC BLOOD PRESSURE: 128 MMHG | WEIGHT: 208.11 LBS | HEART RATE: 83 BPM | TEMPERATURE: 97.9 F | RESPIRATION RATE: 15 BRPM | OXYGEN SATURATION: 98 %

## 2019-03-13 LAB
ANION GAP SERPL CALC-SCNC: 5 MMOL/L (ref 5–15)
BUN SERPL-MCNC: 23 MG/DL (ref 6–20)
BUN/CREAT SERPL: 14 (ref 12–20)
CALCIUM SERPL-MCNC: 8.3 MG/DL (ref 8.5–10.1)
CHLORIDE SERPL-SCNC: 112 MMOL/L (ref 97–108)
CO2 SERPL-SCNC: 25 MMOL/L (ref 21–32)
CREAT SERPL-MCNC: 1.63 MG/DL (ref 0.7–1.3)
GLUCOSE SERPL-MCNC: 155 MG/DL (ref 65–100)
HGB BLD-MCNC: 11.2 G/DL (ref 12.1–17)
POTASSIUM SERPL-SCNC: 4.9 MMOL/L (ref 3.5–5.1)
SODIUM SERPL-SCNC: 142 MMOL/L (ref 136–145)

## 2019-03-13 PROCEDURE — 97530 THERAPEUTIC ACTIVITIES: CPT

## 2019-03-13 PROCEDURE — 85018 HEMOGLOBIN: CPT

## 2019-03-13 PROCEDURE — 74011250637 HC RX REV CODE- 250/637: Performed by: ORTHOPAEDIC SURGERY

## 2019-03-13 PROCEDURE — 36415 COLL VENOUS BLD VENIPUNCTURE: CPT

## 2019-03-13 PROCEDURE — 74011250636 HC RX REV CODE- 250/636: Performed by: ORTHOPAEDIC SURGERY

## 2019-03-13 PROCEDURE — 97116 GAIT TRAINING THERAPY: CPT

## 2019-03-13 PROCEDURE — 97165 OT EVAL LOW COMPLEX 30 MIN: CPT

## 2019-03-13 PROCEDURE — 80048 BASIC METABOLIC PNL TOTAL CA: CPT

## 2019-03-13 RX ORDER — OXYCODONE HYDROCHLORIDE 5 MG/1
5 TABLET ORAL
Qty: 43 TAB | Refills: 0 | Status: SHIPPED | OUTPATIENT
Start: 2019-03-13 | End: 2019-03-16

## 2019-03-13 RX ORDER — ONDANSETRON 4 MG/1
4 TABLET, ORALLY DISINTEGRATING ORAL
Qty: 21 TAB | Refills: 0 | Status: SHIPPED | OUTPATIENT
Start: 2019-03-13

## 2019-03-13 RX ORDER — ASPIRIN 81 MG/1
81 TABLET ORAL 2 TIMES DAILY
Qty: 60 TAB | Refills: 0 | Status: SHIPPED | OUTPATIENT
Start: 2019-03-13

## 2019-03-13 RX ORDER — AMOXICILLIN 250 MG
1 CAPSULE ORAL 2 TIMES DAILY
Qty: 60 TAB | Refills: 0 | Status: SHIPPED | OUTPATIENT
Start: 2019-03-13

## 2019-03-13 RX ADMIN — SENNOSIDES AND DOCUSATE SODIUM 1 TABLET: 8.6; 5 TABLET ORAL at 08:33

## 2019-03-13 RX ADMIN — ACETAMINOPHEN 650 MG: 325 TABLET ORAL at 05:21

## 2019-03-13 RX ADMIN — ACETAMINOPHEN 650 MG: 325 TABLET ORAL at 12:26

## 2019-03-13 RX ADMIN — POLYETHYLENE GLYCOL 3350 17 G: 17 POWDER, FOR SOLUTION ORAL at 08:33

## 2019-03-13 RX ADMIN — ASPIRIN 81 MG: 81 TABLET ORAL at 08:33

## 2019-03-13 RX ADMIN — PYRIDOXINE HCL TAB 50 MG 100 MG: 50 TAB at 08:33

## 2019-03-13 RX ADMIN — Medication 2 G: at 05:21

## 2019-03-13 RX ADMIN — OXYCODONE HYDROCHLORIDE 10 MG: 5 TABLET ORAL at 08:41

## 2019-03-13 RX ADMIN — FAMOTIDINE 20 MG: 20 TABLET ORAL at 08:33

## 2019-03-13 NOTE — PROGRESS NOTES
PT SLEEPING I BED EASY TO AWAKE PT MEDICATED FOR PAIN SEE MARS. DRESSING R KNEE CD&I PEDAL PULSE PRESENT CAP REFILL WNL. PT DENIES ANY NUMBNESS  ICE PACK APPLIED CALL LIGHT IN REACH PT ABLE TO MAKE NEEDS KNOWN.  PT VOIDING VIA URINAL NO DISTRESS OBSERVED OR REPORTED

## 2019-03-13 NOTE — PROGRESS NOTES
Patient handed various scripts including a script for oxycodone IR. Nurse handed patient a copy of instructions. All scripts and instructions read and explained to him and his wife.  Verbalized understanding

## 2019-03-13 NOTE — DISCHARGE SUMMARY
@5GAJR@ [address] [place] [address]       DISCHARGE SUMMARY     Patient: Andrew Esparza                             Medical Record Number: [id number]                : 1940  Age: 78 y.o. Admit Date: 3/12/2019  Discharge Date: 3/13/2019    Admission Diagnosis: Osteoarthritis of right knee, unspecified osteoarthritis type [M17.11]  Osteoarthritis of right knee [M17.11]  Osteoarthritis of right knee, unspecified osteoarthritis type [M17.11]  Discharge Diagnosis: Osteoarthritis of right knee    Procedures: Procedure(s):  RIGHT CONVERSION TOTAL KNEE ARTHROPLASTY    Surgeon: Destinee Doan. Zaheer Merritt MD    Anesthesia: spinal and adductor block  Complications: None     History of Present Illness:  Andrew Esparza is a 78 y.o. male with a history of Right knee pain, swelling, and marked loss of function. Despite conservative management and after clinical and radiographic evaluation, it was determined that he suffered from end-stage osteoarthritis and would benefit from Procedure(s):  RIGHT CONVERSION TOTAL KNEE ARTHROPLASTY, which he consented to undergo after a discussion of the risks, benefits, alternatives, rehab concerns, and potential complications of surgery. Hospital Course:  Andrew Esparza tolerated the procedure well. He was transferred  to the recovery room in stable condition. After a brief stay the patient was then transferred to the Joint Replacement Unit at [address] [place] [address]. They received prophylactic intravenous antibiotics. DVT prophylaxis included SCDs, early ambulation, and ASA was started the evening of surgery. The patient was able to tolerate a regular diet and their pain was reasonably well controlled. The patient progressed well throughout the hospitalization and was deemed stable for discharge on above listed date.     Disposition:  Home with Home Health    Discharge Medications:  Current Discharge Medication List      START taking these medications Details   aspirin delayed-release 81 mg tablet Take 1 Tab by mouth two (2) times a day. Qty: 60 Tab, Refills: 0      oxyCODONE IR (ROXICODONE) 5 mg immediate release tablet Take 1 Tab by mouth every four (4) hours as needed for Pain for up to 3 days. Max Daily Amount: 30 mg.  Qty: 43 Tab, Refills: 0    Associated Diagnoses: Primary osteoarthritis of right knee      senna-docusate (PERICOLACE) 8.6-50 mg per tablet Take 1 Tab by mouth two (2) times a day. Qty: 60 Tab, Refills: 0      ondansetron (ZOFRAN ODT) 4 mg disintegrating tablet Take 1 Tab by mouth every eight (8) hours as needed for Nausea. Qty: 21 Tab, Refills: 0         CONTINUE these medications which have NOT CHANGED    Details   atorvastatin (LIPITOR) 40 mg tablet Take 40 mg by mouth nightly. valsartan (DIOVAN) 160 mg tablet Take 160 mg by mouth daily. zolpidem (AMBIEN) 10 mg tablet Take 10 mg by mouth nightly. carvedilol (COREG) 3.125 mg tablet Take 3.125 mg by mouth nightly. pyridoxine, vitamin B6, (VITAMIN B-6) 100 mg tablet Take 100 mg by mouth daily. STOP taking these medications       HYDROcodone-acetaminophen (NORCO) 5-325 mg per tablet Comments:   Reason for Stopping:         aspirin (ASPIRIN) 325 mg tablet Comments:   Reason for Stopping:                 My Medications      START taking these medications      Instructions Each Dose to Equal Morning Noon Evening Bedtime   aspirin delayed-release 81 mg tablet  Replaces:  aspirin 325 mg tablet  Your last dose was:  08:33 AM  Your next dose is: This evening      Take 1 Tab by mouth two (2) times a day. 81 mg         ondansetron 4 mg disintegrating tablet  Commonly known as:  ZOFRAN ODT  Your last dose was:  Not taken in hospital  Your next dose is:  Resume       Take 1 Tab by mouth every eight (8) hours as needed for Nausea. 4 mg         senna-docusate 8.6-50 mg per tablet  Commonly known as:  PERICOLACE  Your last dose was:  08:33 AM  Your next dose is:   This evening      Take 1 Tab by mouth two (2) times a day. 1 Tab            CONTINUE taking these medications      Instructions Each Dose to Equal Morning Noon Evening Bedtime   atorvastatin 40 mg tablet  Commonly known as:  LIPITOR  Your last dose was: Last night  Your next dose is: Tonight      Take 40 mg by mouth nightly. 40 mg         COREG 3.125 mg tablet  Generic drug:  carvedilol  Your last dose was: Last night  Your next dose is: Tonight      Take 3.125 mg by mouth nightly. 3.125 mg         valsartan 160 mg tablet  Commonly known as:  DIOVAN  Your last dose was:  Not taken in hospital  Your next dose is:  Resume      Take 160 mg by mouth daily. 160 mg         VITAMIN B-6 100 mg tablet  Generic drug:  pyridoxine (vitamin B6)  Your last dose was:  08:33 AM  Your next dose is:  Tomorrow      Take 100 mg by mouth daily. 100 mg         zolpidem 10 mg tablet  Commonly known as:  AMBIEN  Your last dose was:  Not taken in hospital  Your next dose is:  Resume      Take 10 mg by mouth nightly. 10 mg            STOP taking these medications    aspirin 325 mg tablet  Commonly known as:  ASPIRIN  Replaced by:  aspirin delayed-release 81 mg tablet        HYDROcodone-acetaminophen 5-325 mg per tablet  Commonly known as:  [address] your doctor about these medications      Instructions Each Dose to Equal Morning Noon Evening Bedtime   oxyCODONE IR 5 mg immediate release tablet  Commonly known as:  Peyton Cox  Ask about: Should I take this medication? Take 1 Tab by mouth every four (4) hours as needed for Pain for up to 3 days. Max Daily Amount: 30 mg.   5 mg               Where to Get Your Medications      Information on where to get these meds will be given to you by the nurse or doctor.     Ask your nurse or doctor about these medications  · aspirin delayed-release 81 mg tablet  · ondansetron 4 mg disintegrating tablet  · oxyCODONE IR 5 mg immediate release tablet  · senna-docusate 8.6-50 mg per tablet           Patient Instructions: The patient will notify me for any increased bleeding, drainage, or progressively worsening pain, or other concerning symptoms. They have been instructed to report to the emergency room immediately for any chest pain or shortness of breath. Activity: WBAT on operative leg    DVT prophylaxis: ASA 81mg PO BIDx 30 days    Wound Care: The patient has a waterproof dressing in place. They can shower with the dressing in place. Continue ace wrap for next 2-3 days for compression and swelling. They will remove the dressing in 7-10 days. At that time, they can get the incision wet in the shower, and will pat dry afterwards. Follow-up visit at [address] [place] [address] for 2 week post-op. Signed:  Renato Lindsay MD  3/20/2019    Tiny Warner.  [facility]Asc[facility] Hospital[phone]  Medical Staff : Austin Howell        Office : [phone]

## 2019-03-13 NOTE — PROGRESS NOTES
Problem: Mobility Impaired (Adult and Pediatric)  Goal: *Acute Goals and Plan of Care (Insert Text)  Physical Therapy Goals  Initiated 3/12/2019    1. Patient will move from supine to sit and sit to supine  in bed with independence within 4 days. 2. Patient will perform sit to stand with modified independence within 4 days. 3. Patient will ambulate with modified independence for 100 feet with the least restrictive device within 4 days. 4. Patient will ascend/descend 4 stairs with 2 handrail(s) with minimal assistance/contact guard assist within 4 days. 5. Patient will perform home exercise program per protocol with independence within 4 days. 6. Patient will demonstrate AROM 0-90 degrees in operative joint within 4 days. physical Therapy TREATMENT  Patient: Gaurav Bermudez (75 y.o. male)  Date: 3/13/2019  Diagnosis: Osteoarthritis of right knee, unspecified osteoarthritis type [M17.11]  Osteoarthritis of right knee [M17.11]  Osteoarthritis of right knee, unspecified osteoarthritis type [M17.11] <principal problem not specified>  Procedure(s) (LRB):  RIGHT CONVERSION TOTAL KNEE ARTHROPLASTY (Right) 1 Day Post-Op  Precautions: Fall, WBAT(no flexion to operative knee >90 degrees)  Chart, physical therapy assessment, plan of care and goals were reviewed. ASSESSMENT:  Pt received in chair, wife at bedside, eager to participate with physical therapy. Impulsive with transfers  with decreased safety awareness with use of RW. Patient became agitated with verbal cues for safety. Ascend/descend 12 steps using single handrail on R and SPC on L with CGA. Gait training x 50' using RW with CGA. Pt verbalized understanding to use RW for all gait and tranfers. RW for home use received and adjusted to proper height. Pt is cleared for discharge from hospital from PT perspective.     Progression toward goals:  [x]      Improving appropriately and progressing toward goals  []      Improving slowly and progressing toward goals  []      Not making progress toward goals and plan of care will be adjusted     PLAN:  Patient continues to benefit from skilled intervention to address the above impairments. Continue treatment per established plan of care. Discharge Recommendations:  Home Health  Further Equipment Recommendations for Discharge:  none     SUBJECTIVE:   I got it    OBJECTIVE DATA SUMMARY:   Range of Motion:  AROM: Within functional limits        RLE AROM  R Knee Flexion: 90  R Knee Extension: 5              Functional Mobility Training:  Bed Mobility:     Supine to Sit: Modified independent     Scooting: Modified independent        Transfers:  Sit to Stand: Stand-by assistance  Stand to Sit: Stand-by assistance        Bed to Chair: Supervision                    Ambulation/Gait Training:  Distance (ft): 50 Feet (ft)  Assistive Device: Walker, rolling;Gait belt  Ambulation - Level of Assistance: Contact guard assistance        Gait Abnormalities: Step to gait                                      Stairs:  Number of Stairs Trained: 12  Stairs - Level of Assistance: Contact guard assistance  Rail Use: Right (SCP on L)  Therapeutic Exercises:   Exercises performed per protocol. See morning treatment note for description. Pain:  Pain Scale 1: Numeric (0 - 10)  Pain Intensity 1: 2  Pain Location 1: Knee  Pain Orientation 1: Right  Pain Description 1: Aching  Pain Intervention(s) 1: Medication (see MAR)  Activity Tolerance:   good  Please refer to the flowsheet for vital signs taken during this treatment.   After treatment:   [x] Patient left in no apparent distress sitting up in chair  [] Patient left in no apparent distress in bed  [x] Call bell left within reach  [x] Nursing notified  [x] Caregiver present  [x] chair alarm activated    COMMUNICATION/COLLABORATION:   The patients plan of care was discussed with: Registered Nurse    Orin Richards   Time Calculation: 14 mins

## 2019-03-13 NOTE — PROGRESS NOTES
Problem: Mobility Impaired (Adult and Pediatric)  Goal: *Acute Goals and Plan of Care (Insert Text)  Physical Therapy Goals  Initiated 3/12/2019    1. Patient will move from supine to sit and sit to supine  in bed with independence within 4 days. 2. Patient will perform sit to stand with modified independence within 4 days. 3. Patient will ambulate with modified independence for 100 feet with the least restrictive device within 4 days. 4. Patient will ascend/descend 4 stairs with 2 handrail(s) with minimal assistance/contact guard assist within 4 days. 5. Patient will perform home exercise program per protocol with independence within 4 days. 6. Patient will demonstrate AROM 0-90 degrees in operative joint within 4 days. physical Therapy TREATMENT  Patient: Leesa Welsh (75 y.o. male)  Date: 3/13/2019  Diagnosis: Osteoarthritis of right knee, unspecified osteoarthritis type [M17.11]  Osteoarthritis of right knee [M17.11]  Osteoarthritis of right knee, unspecified osteoarthritis type [M17.11] <principal problem not specified>  Procedure(s) (LRB):  RIGHT CONVERSION TOTAL KNEE ARTHROPLASTY (Right) 1 Day Post-Op  Precautions: Fall, WBAT(no flexion to operative knee >90 degrees)  Chart, physical therapy assessment, plan of care and goals were reviewed. ASSESSMENT:  Pt received in bed, reporting 0/10 pain, agreeable to participate with physical therapy. Demonstrates good QS, able to SLR independently. Mod I to come to sitting EOB. CGA for functional transfers using RW for steadying. No knee buckling noted. Gait training x 50' using RW with CGA. Pt returned to chair, alarm in place. BP stable, denies dizziness, nausea, lightheadedness with activity. Will attempt stair training this afternoon.   Progression toward goals:  [x]      Improving appropriately and progressing toward goals  []      Improving slowly and progressing toward goals  []      Not making progress toward goals and plan of care will be adjusted PLAN:  Patient continues to benefit from skilled intervention to address the above impairments. Continue treatment per established plan of care. Discharge Recommendations:  Home Health  Further Equipment Recommendations for Discharge:  none     SUBJECTIVE:   Patient stated Franck Coughlin am ready to do this.     OBJECTIVE DATA SUMMARY:   Critical Behavior:  Neurologic State: Alert  Orientation Level: Oriented X4  Cognition: Appropriate decision making, Appropriate for age attention/concentration, Appropriate safety awareness  Safety/Judgement: Good awareness of safety precautions  Range of Motion:           RLE AROM  R Knee Flexion: 90  R Knee Extension: 5              Functional Mobility Training:  Bed Mobility:     Supine to Sit: Modified independent     Scooting: Modified independent        Transfers:  Sit to Stand: Contact guard assistance  Stand to Sit: Contact guard assistance                             Balance:  Sitting: Intact  Standing: Intact; With support  Ambulation/Gait Training:  Distance (ft): 50 Feet (ft)  Assistive Device: Walker, rolling;Gait belt  Ambulation - Level of Assistance: Contact guard assistance        Gait Abnormalities: Step to gait                                      Stairs:            Therapeutic Exercises:     EXERCISE   Sets   Reps   Active Active Assist   Passive Self ROM   Comments   Ankle Pumps   [x]                                        []                                        []                                        []                                           Quad Sets   [x]                                        []                                        []                                        []                                           Hamstring Sets   []                                        []                                        []                                        []                                           Short Arc Quads   [x] []                                        []                                        []                                           Knee Extension Stretch     []                                          []                                          [x]                                          []                                           Heel Slides   [x]                                        []                                        []                                        []                                           Long Arc Quads   [x]                                        []                                        []                                        []                                           Knee Flexion Stretch   []                                        []                                        []                                        []                                           Straight Leg Raises   [x]                                        []                                        []                                        []                                             Pain:  Pain Scale 1: Numeric (0 - 10)  Pain Intensity 1: 3  Pain Location 1: Knee  Pain Orientation 1: Right  Pain Description 1: Aching  Pain Intervention(s) 1: Medication (see MAR)  Activity Tolerance:   good  Please refer to the flowsheet for vital signs taken during this treatment.   After treatment:   [x] Patient left in no apparent distress sitting up in chair  [] Patient left in no apparent distress in bed  [x] Call bell left within reach  [x] Nursing notified  [] Caregiver present  [x] chair alarm activated    COMMUNICATION/COLLABORATION:   The patients plan of care was discussed with: Registered Nurse    Ben Hines   Time Calculation: 24 mins

## 2019-03-13 NOTE — PROGRESS NOTES
Bedside shift change report given to Roopa Valdovinos RN (oncoming nurse) by Harshal Quevedo RN (offgoing nurse). Report included the following information SBAR, Kardex and MAR.

## 2019-03-13 NOTE — DISCHARGE INSTRUCTIONS
Total Joint Replacement post operative instructions   Follow-Up Appointment:  o You should already be scheduled for a 2-week follow-up appointment, but if you are unsure about your follow-up date, please call our office at [phone]. o If you do not have this appointment, it should be scheduled 2 weeks from the date of your surgery     Activity:  o Unless you have been specifically instructed otherwise, you can advance your activity as tolerated.   o You have no hip precautions. Be sure any physical therapist who is working with you understands this and encourage them to call my office with any questions.   o You may also bear weight fully on your hip with a walker and transition to a cane or crutch as soon as you feel comfortable and strong enough. That being said, advance your activity in a stepwise and cautious manner. You will likely feel better than your hip is ready for.    o Please refrain from any high level activities until we see you back at your follow up appointment. This includes golfing, exercising, and other more intense activities. o Perform your exercises as often as possible, at least 2 times a day.  o Application of the ice packs will prevent and treat inflammation and reduce pain and swelling. You should ice the incisional area at least 3 times a day, 20-30 minutes at a time.  Dressings / Wound Care:  o Your waterproof dressing should be removed between 7-10 days from surgery. You can do this yourself or your home therapy personnel can do it for you.  o Dermabond (skin glue) may be used to help close the incision, which appears as a thin, transparent covering over the incision. Do not pick or pull at this covering, this will fall off naturally within 2-3 weeks. o Do not apply antibiotic ointment to your incisions. o Once your waterproof dressing has been removed, you may change bandages daily if you like or leave open to air.   These can be purchased at your local pharmacy. o Most incisions are closed with absorbable sutures but if not the sutures or staples will be removed at your 2 week follow up.  Showering / Bathing:  o If your incision is dry without drainage you may shower following your discharge home. The dressing is waterproof as long as well affixed to skin.  o You may shower with the waterproof dressing in place, but dry dressings should be removed before showering.   o It is fine to have water run over the incision after the waterproof dressing has been removed. o Do not vigorously scrub your incision. o Do not take a bath or get into a swimming pool / [employer] until you follow up with Dr. Cassy Uribe. o Do not soak your incision under water for 6 weeks. o If there is continued drainage or you are concerned contact Dr. Daniella Hernandez office prior to showering [phone]. [phone]     Diet:  o You may advance to your regular diet as tolerated. o Proper nutrition is crucial to healing. Make sure you are eating as healthy as possible and following a balanced diet after your surgery. o Avoid processed foods and eat mainly fruits and vegetables.  Medications:  o It is our goal to keep you as comfortable as possible after your surgery. Please take your medications as prescribed without exceeding the recommended dosage. o It is also important to understand that pain has a cycle. It begins and increases until medication interrupts it. The aim of good pain control is to stop the pain before it becomes intolerable. The key is to stay ahead of the pain.  o We encourage patients to discontinue pain medications as soon as possible after surgery. o The side effects of these medications can be substantial and the narcotic medications are not mandatory. You may substitute a prescribed narcotic with over-the-counter Tylenol.  o We along with your narcotic will likely give you two other pain medications Celebrex and tramadol.   o Pain medications may cause constipation- Colace twice daily and Miralax while taking the narcotic medication should help prevent constipation. o Other possible side effects of pain medication include dizziness, headache, nausea, vomiting, and urinary retention. o Discontinue the pain medication if you develop itching, rash, shortness of breath, or difficulties swallowing. If these symptoms become severe or are not relieved by discontinuing the medication, you should seek immediate medical attention. o Refills of pain medication are authorized during office hours only (8 AM- 5 PM  Monday through Friday). Our office will not prescribe narcotics beyond 6 weeks from the date of your surgery. o Narcotics will not be called into the pharmacy and, in most cases, you must be seen clinically.  Blood Thinner:  o You will be given a prescription for either Aspirin or Xarelto based on your health history.  o You should take these medications as prescribed for 30 days following your surgery and then an 81mg for 2 additional months if you dont already take one.  Driving:  o You should not return to driving until you are off all narcotic pain medications and able to safely and quickly apply the brakes. This is normally 2-4 weeks for left sided joint replacements and 2-6 weeks for right-sided joint replacements.  Airports and metal detectors  o ID cards used to be routinely given after joint replacement, however they do not save you any time and arent helpful to TSA or security. Most joint replacements do not set off metal detectors. If the detector is set off, just tell the  you have a joint replacement.  Signs/Symptoms of Concern: Contact Dr. Rosales Warriors [facility] office if any of the following signs or symptoms develop. Please be advised if a problem arises which you feel requires immediate medical attention you should seek medical attention at the closest ER.   o Temperature greater than 101.5 for more than 8 hrs  o Fever and/or chills that persist for greater than 8 hr.  o A sudden increase in pain/swelling/ or tenderness in the back of your calf or thigh that isnt relieved with ice/elevation and pain medication. o Increased drainage from your incision, increased redness or warmth at the area of your incision or a sudden increase in swelling or redness that persists despite ice and elevation     Feedback  o I want to provide the very best care for you. Feel free to email me comments and suggestions on how I can improve the experience for you and future patients. I will be here with you every step of the way. It is my privilege to be your surgeon. Email: [email or web address] or Website: [email or web address]  special total knee instructions  1. Full extension at the knee is the most important aspect of your range of motion. Avoid placing a pillow or bump behind the knee. Rather, place the heel up on a bump or pillow and allow gravity to help straighten the knee. 2. You may weight bear as tolerated on the knee and during the day you should bend the knee as much as possible. 3. Drainage from the incision more than 4 days from surgery is concerning. Contact my office if there is any question [phone]. [phone].  4. Steps to take for knee drainage :  a. Keep the knee fully extended, avoid knee flexion beyond 45 degrees until the drainage stops. b. Apply gauze dressing over the incision followed by an ace-wrap applied snugly  c. Hold physical therapy  d. If the wound continues to drain after 4-6 hours, call the office and hold all blood thinners.

## 2019-03-13 NOTE — PROGRESS NOTES
3/13/2019 11:16 AM Rw delivered to pt. At 1 Ilana Morrison was sent pt's discharge clinicals and notified of pt's discharge home today via [employer]. Planning for pt to discharge home this afternoon if cleared by PT, pt's wife will transport pt home.  REBECCA Wilson

## 2019-03-13 NOTE — PROGRESS NOTES
Problem: Falls - Risk of  Goal: *Absence of Falls  Document Autumn Fall Risk and appropriate interventions in the flowsheet.   Outcome: Progressing Towards Goal  Fall Risk Interventions:  Mobility Interventions: Bed/chair exit alarm         Medication Interventions: Bed/chair exit alarm    Elimination Interventions: Bed/chair exit alarm

## 2019-03-13 NOTE — PHYSICIAN ADVISORY
Short Stay Review       Pt Name:  Josie Stroud   MR#  [id number]   CSN#   [id number]   Room and Hospital  429/01  @ [facility]   Hospitalization date  3/12/2019  5:30 AM  No discharge date for patient encounter. Current Attending Physician  Kalpana Lamas MD     A discharge order has been placed for this episode of hospital care for Mr. Josie Stroud; since this hospital stay is less than two midnights, I reviewed Mr. Paulina Garrido's chart. Mr. Paulina Garrido's healthcare insurance/benefit include:  [place]or: [place] MED[place] / Plan: [place] MEDICARE PART A & B / Product Type: Medicare /     Utilization Review related case summary:   Age  78 y.o.   BMI  There is no height or weight on file to calculate BMI.     Functional status ASA Class  3   PMHx includes  CAD, CABG, Arrhythmia, AFib, s/p PPM, RBBB    EKG  RBBB   Echo     Hospital course  Uncomplicated TKA    PT OT evaluation     Other Social/logistical issues     Risk of deterioration at the time this patient was hospitalized  High             On the basis of chart review, this patient's hospitalization status      is appropriate for Bryant Newell MD MPH FACP   Cell: [phone]  Physician [address]   Utilization Review, Care Management         CSN:  [id number]   MELODY:   [id number]  Admitted on :  3/12/2019   Discharge order

## 2019-03-27 NOTE — OP NOTES
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 and lateral osteoarthritis of right knee  Postoperative Diagnosis: Right knee medial unicompartmental arthropalasty with patellofemoral and lateral osteoarthritis of right knee  Procedure: Revision Right Total Knee Arthroplasty, Conversion from Rght medial Unicondylar Arthroplasty   Surgeon: Makenna Payton MD  Assistant(s): Xavier Branch  Anesthesia: Other   Estimated Blood Loss:150  Tourniquet Time: 120 min @ 250 mmHg   Specimens: * No specimens in log *   Complications: None           INDICATIONS:  The patient is a 78 y.o., male with an history of status post a unicompartmental who despite extensive conservative treatment, to include activity modification, anti-inflammatory medication continued to have activity limiting pain that interferes with quality of life. Radiographs demonstrate progression of knee osteoarthritis. They were felt to be appropriate for knee revision arthroplasty surgery. Upon discussion of the risks and benefits of surgery, the patient decided to proceed with revision knee arthroplasty surgery. Risks including but not limited to periprosthetic fracture, periprosthetic infection, blood clot (DVT and PE), aseptic loosening, wear, instability, extensor mechanism injury, stiffness, leg length discrepancy, malalignment, malrotation, myocardial infarction, arrhythmia, cerebrovascular accident, postoperative confusion/delirium, as well as death were explained and discussed at length. The patient demonstrated an understanding of the risks and potential benefits and decided to proceed with surgery.         OPERATIVE PROCEDURE:  Prior to initiation of the operative procedure a surgical time-out was taken and the patient's name, medical record, number, date-of-birth, and operative side was verified with the surgical consent form. Additionally it was verified that the appropriate tiara-operative antibiotic administration was completed, as well as that radiographs were available and the correct operative instrumentation and implants were available. After routine preparation and draping of the patient, the esmark bandage was utilized to exsanguinate the extremity and the tourniquet inflated. A midline skin incision was made over the knee joint utilizing the previous surgical incision. The medial border of the patella, tibial tubercle, and VMO were clearly identified. A medial parapatellar approach was made to the left knee joint. There was no purulence or inflammation noted upon exposure of the knee joint. A complete synovectomy including the medial and lateral gutters was done. A medial release was performed using blunt dissection with a raman elevator and electrocautery. The knee was flexed to 90 degrees. A dayna retractor was then placed in the posterior aspect of the knee and the proximal tibial surface exposed. The implant-cement and the cement-bone interfaces were exposed. It was noted that the tibial component was well fixed. The implant-cement interface was disrupted utilizing osteotomes. The tibial component was removed without event. The tibial cutting guide was used to make a flat cut across the lateral tibial plateau and the remaining implant cement was then removed from the cut surface of the tibia utilizing a saw, high-speed rupert and a rongeur. Attention was turned to the femur. Dayna retractors were placed to expose the femur. The implant-cement and the cement-bone interfaces on the femur were exposed. It was noted that the femoral component was well fixed.  The implant-cement interface was disrupted utilizing osteotomes and TPS oscillating saw. The femoral component was removed. This was replaced for femoral cutting guide placement. A drill was used to access the intramedullary canal and the  intramedullary cutting guide was placed and set at 5 degrees. The distal  femoral cut was set and  then the implant was removed and the distal cut was made. The distal femoral cut was completed and a size 5 4-in-1 cutting block applied. The posterior, chamfer, and anterior cuts were made. After cuts were made the remaining cement was then removed from the cut surface of the femur utilizing a high-speed rupert and a rongeur. A size 5 standard femoral component was trialed with a size 5 tibial component. A 11 mm polyethylene was utilized on the tibia. This construct was noted to be stable, with optimal range of motion, patellar tracking and varus-valgus stability. The incision was copiously irrigated prior to implantation of the final components. Trial and final reduction revealed excellent alignment, stability, range of motion and patellar tracking, as well as restoration of the joint line. The components were affixed utilizing the [employer] Simplex P antibiotic bone cement in a hybrid-fixation manner. The arthrotomy was closed with #1  Vicryl sutures and #1  stratafix running barbed suture. The subcutaneous tissue was closed with 2-0  Vicryl suture and a running barbed 3-0 Monocryl  was used in the dermal layer with Dermabond on the skin. Patient then had a waterproof dressing applied with bulky Ross and Ace wrap. The patient was awoken from anesthesia and tolerated the procedure without problems. The patient was transferred to the recovery room in stable condition. Postoperative Plan:     The patient will be admitted to the hospital. Physical therapy will be started, weightbearing as tolerated on the operative extremity.  The patient received tiara-operative prophylactic intravenous antibiotics. DVT prophylaxis will include ASA 81 mg BID for 4 weeks. The patient will follow up in 2 weeks for wound check and x-rays. Signed by: Gen Saxena MD, MD     IMPLANTS :   Implant Name Type Inv.  Item Serial No.  Lot No. LRB No. Used Action   CEMENT BNE SIMPLEX TOBRA 4 --  - SN/A Cement CEMENT BNE SIMPLEX TOBRA 4 --  N/A [employer] HOW [id number] Right 2 Implanted   PAT ASYM TRIATHLN X3 [id number] -- TRIATHLON ASYMMETRIC X3 - SN/A Joint Component PAT ASYM TRIATHLN X3 [id number] -- TRIATHLON ASYMMETRIC X3 N/A [employer] HOW [id number] Right 1 Implanted   FEM KNE CATA CR SZ 5 RT -- TRIATHLON - SN/A Joint Component FEM KNE CATA CR SZ 5 RT -- TRIATHLON N/A [employer] HOW [id number] Right 1 Implanted   BASEPLT TIB UNIV TRIATHLN 5 --  - SN/A Joint Component BASEPLT TIB UNIV TRIATHLN 5 --  N/A [employer] HOW [id number] Right 1 Implanted   INSERT TIB ARTC CRUC SZ5 11MM --  - SN/A Joint Component INSERT TIB ARTC CRUC SZ5 11MM --  N/A [employer] HOW [id number] Right 1 Implanted   KNEE CATA TRIATHLON X3 -- K5 BSV - [id number]  KNEE CATA TRIATHLON X3 -- K5 BSV  [employer] HOW  Right 1 Implanted                OPERATIVE FINDINGS : Stable medial components, PF and lateral compartment end stage OA

## 2020-03-25 NOTE — PROGRESS NOTES
Orders received, chart reviewed and patient evaluated by occupational therapy. Recommend patient to discharge to home. Recommend with nursing patient to complete as able in order to maintain strength, endurance and independence: OOB to chair 3x/day, ADLs with supervision/setup and mobilizing to the bathroom for toileting with SBA assist. Thank you for your assistance. Full evaluation to follow. Labor at Term

## 2021-07-06 ENCOUNTER — APPOINTMENT (OUTPATIENT)
Dept: CT IMAGING | Age: 81
End: 2021-07-06
Attending: PHYSICIAN ASSISTANT
Payer: MEDICARE

## 2021-07-06 ENCOUNTER — HOSPITAL ENCOUNTER (EMERGENCY)
Age: 81
Discharge: HOME | End: 2021-07-06
Attending: EMERGENCY MEDICINE
Payer: MEDICARE

## 2021-07-06 VITALS
OXYGEN SATURATION: 96 % | TEMPERATURE: 97.1 F | SYSTOLIC BLOOD PRESSURE: 133 MMHG | DIASTOLIC BLOOD PRESSURE: 69 MMHG | HEIGHT: 71 IN | HEART RATE: 91 BPM | WEIGHT: 195 LBS | BODY MASS INDEX: 27.3 KG/M2 | RESPIRATION RATE: 16 BRPM
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
## 2021-07-06 DIAGNOSIS — S32.110A CLOSED NONDISPLACED ZONE I FRACTURE OF SACRUM, INITIAL ENCOUNTER (HCC): Primary | ICD-10-CM

## 2021-07-06 PROCEDURE — 72192 CT PELVIS W/O DYE: CPT

## 2021-07-06 PROCEDURE — 99283 EMERGENCY DEPT VISIT LOW MDM: CPT

## 2021-07-06 PROCEDURE — 74011250637 HC RX REV CODE- 250/637: Performed by: PHYSICIAN ASSISTANT

## 2021-07-06 PROCEDURE — 74011000250 HC RX REV CODE- 250: Performed by: PHYSICIAN ASSISTANT

## 2021-07-06 PROCEDURE — 72131 CT LUMBAR SPINE W/O DYE: CPT

## 2021-07-06 RX ORDER — ACETAMINOPHEN 325 MG/1
650 TABLET ORAL
Status: COMPLETED | OUTPATIENT
Start: 2021-07-06 | End: 2021-07-06

## 2021-07-06 RX ORDER — LIDOCAINE 50 MG/G
PATCH TOPICAL
Qty: 30 EACH | Refills: 0 | Status: SHIPPED | OUTPATIENT
Start: 2021-07-06

## 2021-07-06 RX ORDER — LIDOCAINE 4 G/100G
1 PATCH TOPICAL EVERY 24 HOURS
Status: DISCONTINUED | OUTPATIENT
Start: 2021-07-06 | End: 2021-07-06 | Stop reason: HOSPADM

## 2021-07-06 RX ADMIN — ACETAMINOPHEN 650 MG: 325 TABLET ORAL at 14:32

## 2021-07-06 NOTE — ED TRIAGE NOTES
Patient reports he was working in the yard and fell backwards landing on his buttocks and then rolled down the slope of his yard-    Pt presents today to have his tailbone and back pain which radiates around to his B/L 'love handles' evaluated. Pt ambulated to triage but had some difficulty standing due to pain. Pt refused a wheelchair to triage.     Pt denies numbness/tingling in his lower extremities and denies loss of bowel or bladder

## 2021-07-07 NOTE — ED PROVIDER NOTES
Marcial Hickey is a 80 y.o. male who presents to ED with cc of 6/10 bilateral lower back pain. Pt states he was working in the yard yesterday, tripped backwards over a bag of gravel and fell backwards in a seated position, \"landing on my tailbone\". Denies head injury or LOC or neck pain. States he is not on blood thinners. Denies headache, aphasia, facial droop or confusion. Has had lower back pain which radiates bilaterally laterally, worse with movement and ambulation. Denies urinary or fecal changes. Denies urinary or fecal incontinence or retention or saddle paresthesias. Denies known break in the skin. Denies abd pain, nausea or vomiting. Pt denies additional symptoms of F/C, cough, congestion, CP, SOB, visual changes. PMHx: Reviewed, as below. PSHx: Reviewed, as below. PCP: Becky Garrison MD    There are no other complaints verbalized at this time.                Past Medical History:   Diagnosis Date    Arrhythmia     hx afib    Arthritis     Cancer ([address]     basal & squamous cell removed   chest & arms    Chronic pain     neck & shoulders    Coronary atherosclerosis of native coronary artery 2/15/2011    2 sets stents & CABG    Essential hypertension, benign 2/15/2011    GERD (gastroesophageal reflux disease)     Heart failure ([address]     Mixed hyperlipidemia 2/15/2011    Renal insufficiency 03/04/2019    Cr 1.57       Past Surgical History:   Procedure Laterality Date    ABDOMEN SURGERY PROC UNLISTED  2009    left inguinal hernia repair    CARDIAC SURG PROCEDURE UNLIST  2009    CABG    CARDIAC SURG PROCEDURE UNLIST  2003    stents x 4    CARDIAC SURG PROCEDURE UNLIST  2007    stents x 4    CARDIAC SURG PROCEDURE UNLIST  02/2018    ablation    CARDIAC SURG PROCEDURE UNLIST  06/2018    ablation for afib    HX HEENT  02/2018    cataract surgery bilat c no lens implnts    HX HEENT  03/2018    laser surgery to correct visual problems after caratact surgery    HX ORTHOPAEDIC Bilateral 2007    partial knee replacemets bilat    HX PACEMAKER  2019    pacemaker inserted    HX UROLOGICAL      vasectomy         No family history on file. Social History     Socioeconomic History    Marital status:      Spouse name: Not on file    Number of children: Not on file    Years of education: Not on file    Highest education level: Not on file   Occupational History    Not on file   Tobacco Use    Smoking status: Former Smoker     Packs/day: 1.00     Years: 6.00     Pack years: 6.00     Quit date:      Years since quittin.5    Smokeless tobacco: Never Used   Substance and Sexual Activity    Alcohol use: Yes     Alcohol/week: 3.0 standard drinks     Types: 1 Shots of liquor, 1 Cans of beer, 1 Glasses of wine per week    Drug use: No    Sexual activity: Not on file   Other Topics Concern    Not on file   Social History Narrative    Not on file     Social Determinants of Health     Financial Resource Strain:     Difficulty of Paying Living Expenses:    Food Insecurity:     Worried About Running Out of Food in the Last Year:     Ran Out of Food in the Last Year:    Transportation Needs:     Lack of Transportation (Medical):  Lack of Transportation (Non-Medical):    Physical Activity:     Days of Exercise per Week:     Minutes of Exercise per Session:    Stress:     Feeling of Stress :    Social Connections:     Frequency of Communication with Friends and Family:     Frequency of Social Gatherings with Friends and Family:     Attends Worship Services:     Active Member of Clubs or Organizations:     Attends Club or Organization Meetings:     Marital Status:    Intimate Partner Violence:     Fear of Current or Ex-Partner:     Emotionally Abused:     Physically Abused:     Sexually Abused: ALLERGIES: Meloxicam    Review of Systems   Constitutional: Negative for chills and fever. HENT: Negative for congestion.     Eyes: Negative for visual disturbance. Respiratory: Negative for cough and shortness of breath. Cardiovascular: Negative for chest pain. Gastrointestinal: Negative for abdominal pain, constipation, diarrhea, nausea and vomiting. Genitourinary: Negative for dysuria and frequency. Musculoskeletal: Positive for back pain. Negative for neck pain. Skin: Negative for wound. Neurological: Negative for syncope, facial asymmetry, speech difficulty and headaches. All other systems reviewed and are negative. Vitals:    07/06/21 1313   BP: 133/69   Pulse: 91   Resp: 16   Temp: 97.1 °F (36.2 °C)   SpO2: 96%   Weight: 88.5 kg (195 lb)   Height: 5' 11\" (1.803 m)            Physical Exam  Vitals and nursing note reviewed. Constitutional:       Appearance: He is not diaphoretic. HENT:      Head: Normocephalic. Right Ear: External ear normal.      Left Ear: External ear normal.   Eyes:      General: No scleral icterus. Cardiovascular:      Rate and Rhythm: Normal rate and regular rhythm. Heart sounds: Normal heart sounds. No murmur heard. Pulmonary:      Effort: Pulmonary effort is normal. No respiratory distress. Breath sounds: Normal breath sounds. No stridor. No wheezing, rhonchi or rales. Abdominal:      General: Bowel sounds are normal. There is no distension. Palpations: Abdomen is soft. There is no mass. Tenderness: There is no abdominal tenderness. There is no guarding or rebound. Hernia: No hernia is present. Musculoskeletal:         General: No swelling or deformity. Cervical back: Normal range of motion. No tenderness or bony tenderness. Thoracic back: No tenderness or bony tenderness. Lumbar back: No tenderness or bony tenderness. Comments: Pt noted to illicit pain when moving from sitting to standing position. Otherwise ambulatory with even gait. Skin:     General: Skin is warm and dry.       Comments: No noted break in the skin along lower back and buttock region. RN as chaperone. Neurological:      Mental Status: He is alert and oriented to person, place, and time. Mental status is at baseline. Sensory: No sensory deficit (grossly intact to BLLE). Motor: No weakness (5/5 flexion/extension bilaterally to hips, knees and ankles). Gait: Gait normal.          MDM  Number of Diagnoses or Management Options     Amount and/or Complexity of Data Reviewed  Tests in the radiology section of CPT®: ordered and reviewed  Discuss the patient with other providers: yes (Dr Calixto Dawn, ED attending  Dr Jonathan Mccloud and Toro NP, Orthopedics)           Procedures            CONSULT NOTE:   3:29 PM  Tita Livingston PA-C spoke with Dr Jonathan Mccloud and Toro NP,   Specialty: Orthopedics  Discussed pt's hx, disposition, and available diagnostic and imaging results. Reviewed care plans. Discussed supportive treatment and pain control and close follow up as outpatient. VITAL SIGNS:  Vitals:    07/06/21 1313   BP: 133/69   Pulse: 91   Resp: 16   Temp: 97.1 °F (36.2 °C)   SpO2: 96%   Weight: 88.5 kg (195 lb)   Height: 5' 11\" (1.803 m)         LABS:  No results found for this or any previous visit (from the past 24 hour(s)). IMAGING:  CT SPINE LUMB WO CONT   Final Result   1. No acute finding. 2. Lumbar and lumbosacral degenerative disc disease as detailed above. CT PELV WO CONT   Final Result   Nondisplaced sacral fracture. Medications During Visit:  Medications   acetaminophen (TYLENOL) tablet 650 mg (650 mg Oral Given 7/6/21 1432)         DECISION MAKING:    Fifi Calles is a 80 y.o. male who comes in as above. Presents after GLF backwards where pt states he landed in a sitting position and now notes pain to his lower back.  No noted break in the skin on PE. CT lumbar and pelvis obtained demonstrating, DDD along lumbar and lumbosacral regions along with \"a nondisplaced fracture of the right sacrum caudally adjacent to the SI joint, and possibly of the contralateral side at the same level. Fracture line is not seen through the body of the sacrum. Coccyx appears intact. Hips show no fracture or dislocation. There is no hip joint effusion. Soft tissues are unremarkable for age. \". Discussed cause with ED attending and orthopedics. Discussed with pt supportive treatment with tylenol, lidocaine patches, cushion for seating and close follow up with ortho spine. We have discussed close return precautions such as onset of blood in urine or stool, other new or concerning symptoms. Opportunity for questions presented. Pt verbalizes their understanding and agreement with care plan. IMPRESSION:  1. Closed nondisplaced zone I fracture of sacrum, initial encounter ([facility]    2. Fall, initial encounter        DISPOSITION:  Discharged      Discharge Medication List as of 7/6/2021  3:50 PM           Follow-up Information     Follow up With Specialties Details Why Contact Info    [facility] EMERGENCY DEPT Emergency Medicine Go to  As needed, If symptoms worsen, of onset of blood in our urine or stool, new or other concerning symptoms [id number]    Vanna Shah MD Orthopedic Surgery Schedule an appointment as soon as possible for a visit   [address] [place] [address]  [phone]              The patient is asked to follow-up with their primary care provider and any other physicians as above in the next several days. They are to call tomorrow for an appointment. We have discussed strict return precautions and the patient is asked to return promptly for any increased concerns or worsening of symptoms and for return precautions regarding their symptoms today. They can return to this emergency department or any other emergency department. I have discussed with them results as above and presented opportunity for questions.  They verbalize their understanding of the aboveand agreement with care plan.    Please note that this dictation was completed with [employer] the computer voice recognition software. Quite often unanticipated grammatical, syntax, homophones, and other interpretive errors are inadvertently transcribed by the computer software. Please disregard these errors. Please excuse any errors that have escaped final proofreading.

## 2022-03-19 PROBLEM — M17.11 OSTEOARTHRITIS OF RIGHT KNEE: Status: ACTIVE | Noted: 2019-03-12

## 2022-04-30 ENCOUNTER — HOSPITAL ENCOUNTER (EMERGENCY)
Age: 82
Discharge: HOME | End: 2022-04-30
Attending: STUDENT IN AN ORGANIZED HEALTH CARE EDUCATION/TRAINING PROGRAM
Payer: MEDICARE

## 2022-04-30 ENCOUNTER — APPOINTMENT (OUTPATIENT)
Dept: GENERAL RADIOLOGY | Age: 82
End: 2022-04-30
Payer: MEDICARE

## 2022-04-30 VITALS
BODY MASS INDEX: 27.3 KG/M2 | RESPIRATION RATE: 18 BRPM | WEIGHT: 195 LBS | HEART RATE: 73 BPM | DIASTOLIC BLOOD PRESSURE: 73 MMHG | SYSTOLIC BLOOD PRESSURE: 125 MMHG | OXYGEN SATURATION: 97 % | TEMPERATURE: 97.5 F | HEIGHT: 71 IN

## 2022-04-30 DIAGNOSIS — M25.512 ACUTE PAIN OF LEFT SHOULDER: Primary | ICD-10-CM

## 2022-04-30 PROCEDURE — 73030 X-RAY EXAM OF SHOULDER: CPT

## 2022-04-30 PROCEDURE — 99283 EMERGENCY DEPT VISIT LOW MDM: CPT

## 2022-04-30 NOTE — ED TRIAGE NOTES
Patient arrives with c/o left shoulder pain following GLF last night. Patient ambulatory to triage. Present with limited movement of left arm. Denies hitting head, no LOC, no use of blood thinning medication. Denies chest pain, headache or dizziness at time of fall and in triage. States was walking up stairs, and tripped on the top step falling onto landing at top of stairs.

## 2022-04-30 NOTE — ED PROVIDER NOTES
Date of Service:  (Not on file)    Patient:  Paul Garzon    Chief Complaint:  No chief complaint on file. HPI:  Paul Garzon is a 80 y.o.  male who presents for evaluation of left shoulder injury due to mechanical fall. States injury occurred last night. Denies hitting his head or loss of consciousness. Patient states that fall was witnessed by his wife. States that he was walking up the stairs and when he got to the top stair he tripped and fell forward. Patient denies any other injuries. Patient denies chest pain, shortness of breath, or abdominal pain. Denies headache or dizziness, neck pain or back pain. Past Medical History:   Diagnosis Date    Arrhythmia     hx afib    Arthritis     Cancer ([facility]     basal & squamous cell removed   chest & arms    Chronic pain     neck & shoulders    Coronary atherosclerosis of native coronary artery 2/15/2011    2 sets stents & CABG    Essential hypertension, benign 2/15/2011    GERD (gastroesophageal reflux disease)     Heart failure ([facility]     Mixed hyperlipidemia 2/15/2011    Renal insufficiency 03/04/2019    Cr 1.57       Past Surgical History:   Procedure Laterality Date    ABDOMEN SURGERY PROC UNLISTED  2009    left inguinal hernia repair    CARDIAC SURG PROCEDURE UNLIST  2009    CABG    CARDIAC SURG PROCEDURE UNLIST  2003    stents x 4    CARDIAC SURG PROCEDURE UNLIST  2007    stents x 4    CARDIAC SURG PROCEDURE UNLIST  02/2018    ablation    CARDIAC SURG PROCEDURE UNLIST  06/2018    ablation for afib    HX HEENT  02/2018    cataract surgery bilat c no lens implnts    HX HEENT  03/2018    laser surgery to correct visual problems after caratact surgery    HX ORTHOPAEDIC Bilateral 2007    partial knee replacemets bilat    HX PACEMAKER  02/14/2019    pacemaker inserted    HX UROLOGICAL      vasectomy         No family history on file.     Social History     Socioeconomic History    Marital status:      Spouse name: Not on file    Number of children: Not on file    Years of education: Not on file    Highest education level: Not on file   Occupational History    Not on file   Tobacco Use    Smoking status: Former Smoker     Packs/day: 1.00     Years: 6.00     Pack years: 6.00     Quit date:      Years since quittin.2    Smokeless tobacco: Never Used   Substance and Sexual Activity    Alcohol use: Yes     Alcohol/week: 3.0 standard drinks     Types: 1 Shots of liquor, 1 Cans of beer, 1 Glasses of wine per week    Drug use: No    Sexual activity: Not on file   Other Topics Concern    Not on file   Social History Narrative    Not on file     Social Determinants of Health     Financial Resource Strain:     Difficulty of Paying Living Expenses: Not on file   Food Insecurity:     Worried About Running Out of Food in the Last Year: Not on file    Leoncio of Food in the Last Year: Not on file   Transportation Needs:     Lack of Transportation (Medical): Not on file    Lack of Transportation (Non-Medical):  Not on file   Physical Activity:     Days of Exercise per Week: Not on file    Minutes of Exercise per Session: Not on file   Stress:     Feeling of Stress : Not on file   Social Connections:     Frequency of Communication with Friends and Family: Not on file    Frequency of Social Gatherings with Friends and Family: Not on file    Attends [employer] Services: Not on file    Active Member of [address] [place] [address] or Organizations: Not on file    Attends Club or Organization Meetings: Not on file    Marital Status: Not on file   Intimate Partner Violence:     Fear of Current or Ex-Partner: Not on file    Emotionally Abused: Not on file    Physically Abused: Not on file    Sexually Abused: Not on file   Housing Stability:     Unable to Pay for Housing in the Last Year: Not on file    Number of [place] in the Last Year: Not on file    Unstable Housing in the Last Year: Not on file         ALLERGIES: Meloxicam and Omeprazole    Review of Systems   Constitutional: Negative for chills and fever. Respiratory: Negative for shortness of breath. Cardiovascular: Negative for chest pain. Gastrointestinal: Negative for abdominal pain. Genitourinary: Negative for dysuria. Musculoskeletal:        Left shoulder pain   Skin: Negative for rash. Allergic/Immunologic: Negative for immunocompromised state. Neurological: Negative for headaches. Psychiatric/Behavioral: Negative for agitation. All other systems reviewed and are negative. Vitals:    04/30/22 0917   BP: 125/73   Pulse: 73   Resp: 18   Temp: 97.5 °F (36.4 °C)   SpO2: 97%   Weight: 88.5 kg (195 lb)   Height: 5' 11\" (1.803 m)            Physical Exam  Vitals and nursing note reviewed. Constitutional:       General: He is not in acute distress. HENT:      Head: Atraumatic. Cardiovascular:      Rate and Rhythm: Normal rate and regular rhythm. Heart sounds: No murmur heard. Pulmonary:      Effort: Pulmonary effort is normal.   Abdominal:      Palpations: Abdomen is soft. Tenderness: There is no abdominal tenderness. Musculoskeletal:         General: Tenderness and signs of injury present. No swelling or deformity. Normal range of motion. Cervical back: Normal range of motion. No tenderness. Comments: TTP to left shoulder. Limited ROM due to pain   Skin:     General: Skin is warm. Neurological:      Mental Status: He is alert and oriented to person, place, and time. Mental status is at baseline. MDM       Procedures      VITAL SIGNS:  No data found. LABS:  No results found for this or any previous visit (from the past 6 hour(s)). IMAGING:  XR SHOULDER LT AP/LAT MIN 2 V   Final Result   No acute abnormality. Medications During Visit:  Medications - No data to display      DECISION MAKING:  Nila Reynolds is a 80 y.o. male who comes in as above.   X-rays of the left shoulder showed no acute abnormality. Patient placed in arm sling. Patient instructed to follow-up with [address] [place] [address]. Patient agreed to plan of care. Return precautions given to patient. IMPRESSION:  1.  Acute pain of left shoulder        DISPOSITION:  Discharged      Discharge Medication List as of 4/30/2022  9:58 AM           Follow-up Information     Follow up With Specialties Details Why Contact Info    [facility]  Schedule an appointment as soon as possible for a visit   [address] [place] [address]  [phone]    [facility] EMERGENCY DEPT Emergency Medicine  If symptoms worsen [address] [place] [address]  [phone]

## 2022-12-08 ENCOUNTER — APPOINTMENT (OUTPATIENT)
Dept: CT IMAGING | Age: 82
End: 2022-12-08
Attending: EMERGENCY MEDICINE
Payer: MEDICARE

## 2022-12-08 ENCOUNTER — HOSPITAL ENCOUNTER (OUTPATIENT)
Age: 82
Setting detail: OBSERVATION
Discharge: HOME | End: 2022-12-09
Attending: EMERGENCY MEDICINE | Admitting: INTERNAL MEDICINE
Payer: MEDICARE

## 2022-12-08 DIAGNOSIS — R53.1 WEAKNESS: ICD-10-CM

## 2022-12-08 DIAGNOSIS — N17.9 ACUTE RENAL FAILURE, UNSPECIFIED ACUTE RENAL FAILURE TYPE (HCC): Primary | ICD-10-CM

## 2022-12-08 DIAGNOSIS — R42 DIZZINESS: ICD-10-CM

## 2022-12-08 PROBLEM — N18.9 CKD (CHRONIC KIDNEY DISEASE): Status: ACTIVE | Noted: 2022-12-08

## 2022-12-08 LAB
ALBUMIN SERPL-MCNC: 3.8 G/DL (ref 3.5–5)
ALBUMIN/GLOB SERPL: 1.2 {RATIO} (ref 1.1–2.2)
ALP SERPL-CCNC: 125 U/L (ref 45–117)
ALT SERPL-CCNC: 24 U/L (ref 12–78)
ANION GAP SERPL CALC-SCNC: 7 MMOL/L (ref 5–15)
AST SERPL-CCNC: 16 U/L (ref 15–37)
BASOPHILS # BLD: 0.1 K/UL (ref 0–0.1)
BASOPHILS NFR BLD: 1 % (ref 0–1)
BILIRUB SERPL-MCNC: 0.9 MG/DL (ref 0.2–1)
BUN SERPL-MCNC: 30 MG/DL (ref 6–20)
BUN/CREAT SERPL: 15 (ref 12–20)
CALCIUM SERPL-MCNC: 9 MG/DL (ref 8.5–10.1)
CHLORIDE SERPL-SCNC: 111 MMOL/L (ref 97–108)
CO2 SERPL-SCNC: 23 MMOL/L (ref 21–32)
COMMENT, HOLDF: NORMAL
CREAT SERPL-MCNC: 1.94 MG/DL (ref 0.7–1.3)
DIFFERENTIAL METHOD BLD: ABNORMAL
EOSINOPHIL # BLD: 0.3 K/UL (ref 0–0.4)
EOSINOPHIL NFR BLD: 5 % (ref 0–7)
ERYTHROCYTE [DISTWIDTH] IN BLOOD BY AUTOMATED COUNT: 13.5 % (ref 11.5–14.5)
GLOBULIN SER CALC-MCNC: 3.2 G/DL (ref 2–4)
GLUCOSE SERPL-MCNC: 111 MG/DL (ref 65–100)
HCT VFR BLD AUTO: 37.8 % (ref 36.6–50.3)
HGB BLD-MCNC: 12.5 G/DL (ref 12.1–17)
IMM GRANULOCYTES # BLD AUTO: 0 K/UL (ref 0–0.04)
IMM GRANULOCYTES NFR BLD AUTO: 0 % (ref 0–0.5)
LYMPHOCYTES # BLD: 1.7 K/UL (ref 0.8–3.5)
LYMPHOCYTES NFR BLD: 27 % (ref 12–49)
MCH RBC QN AUTO: 31.7 PG (ref 26–34)
MCHC RBC AUTO-ENTMCNC: 33.1 G/DL (ref 30–36.5)
MCV RBC AUTO: 95.9 FL (ref 80–99)
MONOCYTES # BLD: 0.7 K/UL (ref 0–1)
MONOCYTES NFR BLD: 10 % (ref 5–13)
NEUTS SEG # BLD: 3.7 K/UL (ref 1.8–8)
NEUTS SEG NFR BLD: 57 % (ref 32–75)
NRBC # BLD: 0 K/UL (ref 0–0.01)
NRBC BLD-RTO: 0 PER 100 WBC
PLATELET # BLD AUTO: 169 K/UL (ref 150–400)
PMV BLD AUTO: 10.9 FL (ref 8.9–12.9)
POTASSIUM SERPL-SCNC: 4.4 MMOL/L (ref 3.5–5.1)
PROT SERPL-MCNC: 7 G/DL (ref 6.4–8.2)
RBC # BLD AUTO: 3.94 M/UL (ref 4.1–5.7)
SAMPLES BEING HELD,HOLD: NORMAL
SODIUM SERPL-SCNC: 141 MMOL/L (ref 136–145)
WBC # BLD AUTO: 6.4 K/UL (ref 4.1–11.1)

## 2022-12-08 PROCEDURE — 80053 COMPREHEN METABOLIC PANEL: CPT

## 2022-12-08 PROCEDURE — 96360 HYDRATION IV INFUSION INIT: CPT

## 2022-12-08 PROCEDURE — 85025 COMPLETE CBC W/AUTO DIFF WBC: CPT

## 2022-12-08 PROCEDURE — 74011250636 HC RX REV CODE- 250/636: Performed by: EMERGENCY MEDICINE

## 2022-12-08 PROCEDURE — 93005 ELECTROCARDIOGRAM TRACING: CPT

## 2022-12-08 PROCEDURE — 74011250637 HC RX REV CODE- 250/637: Performed by: HOSPITALIST

## 2022-12-08 PROCEDURE — 99285 EMERGENCY DEPT VISIT HI MDM: CPT

## 2022-12-08 PROCEDURE — 36415 COLL VENOUS BLD VENIPUNCTURE: CPT

## 2022-12-08 PROCEDURE — 65270000029 HC RM PRIVATE

## 2022-12-08 PROCEDURE — G0378 HOSPITAL OBSERVATION PER HR: HCPCS

## 2022-12-08 PROCEDURE — 70450 CT HEAD/BRAIN W/O DYE: CPT

## 2022-12-08 RX ORDER — SODIUM CHLORIDE, SODIUM LACTATE, POTASSIUM CHLORIDE, CALCIUM CHLORIDE 600; 310; 30; 20 MG/100ML; MG/100ML; MG/100ML; MG/100ML
75 INJECTION, SOLUTION INTRAVENOUS CONTINUOUS
Status: DISCONTINUED | OUTPATIENT
Start: 2022-12-08 | End: 2022-12-09 | Stop reason: HOSPADM

## 2022-12-08 RX ORDER — HYDROCODONE BITARTRATE AND ACETAMINOPHEN 5; 325 MG/1; MG/1
1 TABLET ORAL
Status: DISCONTINUED | OUTPATIENT
Start: 2022-12-08 | End: 2022-12-09 | Stop reason: HOSPADM

## 2022-12-08 RX ORDER — SODIUM CHLORIDE 0.9 % (FLUSH) 0.9 %
5-40 SYRINGE (ML) INJECTION EVERY 8 HOURS
Status: DISCONTINUED | OUTPATIENT
Start: 2022-12-08 | End: 2022-12-09 | Stop reason: HOSPADM

## 2022-12-08 RX ORDER — HEPARIN SODIUM 5000 [USP'U]/ML
5000 INJECTION, SOLUTION INTRAVENOUS; SUBCUTANEOUS EVERY 8 HOURS
Status: DISCONTINUED | OUTPATIENT
Start: 2022-12-08 | End: 2022-12-09 | Stop reason: HOSPADM

## 2022-12-08 RX ORDER — ASPIRIN 81 MG/1
81 TABLET ORAL 2 TIMES DAILY
Status: DISCONTINUED | OUTPATIENT
Start: 2022-12-08 | End: 2022-12-09 | Stop reason: HOSPADM

## 2022-12-08 RX ORDER — CARVEDILOL 3.12 MG/1
3.12 TABLET ORAL
Status: DISCONTINUED | OUTPATIENT
Start: 2022-12-08 | End: 2022-12-08

## 2022-12-08 RX ORDER — ZOLPIDEM TARTRATE 5 MG/1
5 TABLET ORAL
Status: DISCONTINUED | OUTPATIENT
Start: 2022-12-08 | End: 2022-12-09 | Stop reason: HOSPADM

## 2022-12-08 RX ORDER — SODIUM CHLORIDE 0.9 % (FLUSH) 0.9 %
5-40 SYRINGE (ML) INJECTION AS NEEDED
Status: DISCONTINUED | OUTPATIENT
Start: 2022-12-08 | End: 2022-12-09 | Stop reason: HOSPADM

## 2022-12-08 RX ORDER — ACETAMINOPHEN 325 MG/1
650 TABLET ORAL
Status: DISCONTINUED | OUTPATIENT
Start: 2022-12-08 | End: 2022-12-09 | Stop reason: HOSPADM

## 2022-12-08 RX ORDER — NALOXONE HYDROCHLORIDE 0.4 MG/ML
0.4 INJECTION, SOLUTION INTRAMUSCULAR; INTRAVENOUS; SUBCUTANEOUS AS NEEDED
Status: DISCONTINUED | OUTPATIENT
Start: 2022-12-08 | End: 2022-12-09 | Stop reason: HOSPADM

## 2022-12-08 RX ORDER — ATORVASTATIN CALCIUM 20 MG/1
20 TABLET, FILM COATED ORAL
Status: DISCONTINUED | OUTPATIENT
Start: 2022-12-08 | End: 2022-12-09 | Stop reason: HOSPADM

## 2022-12-08 RX ADMIN — SODIUM CHLORIDE 500 ML: 9 INJECTION, SOLUTION INTRAVENOUS at 15:07

## 2022-12-08 RX ADMIN — ZOLPIDEM TARTRATE 5 MG: 5 TABLET ORAL at 23:20

## 2022-12-08 RX ADMIN — ZOLPIDEM TARTRATE 5 MG: 5 TABLET ORAL at 23:17

## 2022-12-08 NOTE — ED TRIAGE NOTES
Pt c/o dizziness off and on for the past 6 wks.  Today woke up with dizziness, attempting to golf today and unable to stand up steady

## 2022-12-08 NOTE — H&P
History & Physical    Primary Care Provider: Mary Cope MD  Source of Information: Patient   CC: Dizziness    History of Presenting Illness:   Abhi Cloud is a 80 y.o. male with past medical history of CAD s/p stents and CABG, afib s/p ablation, who presents to the ED with the c/o dizziness. Patient reports that about 4-5 weeks ago he was started on 20 mg lasix due to nocturia. He says since then he has been having dizzy spells which has been ongoing over the last 4-5 weeks. This morning while playing gold he was wobbly and could hardly stand. He left and went home and was brought to the ER by a relative. The patient denies any fever, chills, chest pain, cough, congestion, recent illness, palpitations, or dysuria. In the ER: VS Temp 97.6, P: 68 b/m, /77 mm hg oxygen sat 97%. Blood work done showed scr 1.94, BUN 30. CXR not done. EKG not done       Review of Systems:  A comprehensive review of systems was negative except for that written in the History of Present Illness.      Past Medical History:   Diagnosis Date    Arrhythmia     hx afib    Arthritis     Cancer ([address]     basal & squamous cell removed   chest & arms    Chronic pain     neck & shoulders    Coronary atherosclerosis of native coronary artery 2/15/2011    2 sets stents & CABG    Essential hypertension, benign 2/15/2011    GERD (gastroesophageal reflux disease)     Heart failure ([address]     Mixed hyperlipidemia 2/15/2011    Renal insufficiency 03/04/2019    Cr 1.57      Past Surgical History:   Procedure Laterality Date    ABDOMEN SURGERY PROC UNLISTED  2009    left inguinal hernia repair    CARDIAC SURG PROCEDURE UNLIST  2009    CABG    CARDIAC SURG PROCEDURE UNLIST  2003    stents x 4    CARDIAC SURG PROCEDURE UNLIST  2007    stents x 4    CARDIAC SURG PROCEDURE UNLIST  02/2018    ablation    CARDIAC SURG PROCEDURE UNLIST  06/2018    ablation for afib    HX HEENT  02/2018    cataract surgery bilat c no lens implnts    HX HEENT  03/2018    laser surgery to correct visual problems after caratact surgery    HX ORTHOPAEDIC Bilateral 2007    partial knee replacemets bilat    HX PACEMAKER  02/14/2019    pacemaker inserted    HX UROLOGICAL      vasectomy     Prior to Admission medications    Medication Sig Start Date End Date Taking? Authorizing Provider   lidocaine (LIDODERM) 5 % Apply patch to the affected area for 12 hours a day and remove for 12 hours a day. 7/6/21   Jalen KAYE PA-C   aspirin delayed-release 81 mg tablet Take 1 Tab by mouth two (2) times a day. 3/13/19   Xavier Herring MD   senna-docusate (PERICOLACE) 8.6-50 mg per tablet Take 1 Tab by mouth two (2) times a day. 3/13/19   Xavier Herring MD   ondansetron (ZOFRAN ODT) 4 mg disintegrating tablet Take 1 Tab by mouth every eight (8) hours as needed for Nausea. 3/13/19   Xavier Herring MD   atorvastatin (LIPITOR) 40 mg tablet Take 40 mg by mouth nightly. Provider, Historical   valsartan (DIOVAN) 160 mg tablet Take 160 mg by mouth daily. Provider, Historical   zolpidem (AMBIEN) 10 mg tablet Take 10 mg by mouth nightly. Provider, Historical   carvedilol (COREG) 3.125 mg tablet Take 3.125 mg by mouth nightly. Provider, Historical   pyridoxine, vitamin B6, (VITAMIN B-6) 100 mg tablet Take 100 mg by mouth daily. Provider, Historical     Allergies   Allergen Reactions    Meloxicam Other (comments)     Fast heart beat    Omeprazole Other (comments)     States causes \"weakness. \"      No family history on file.      SOCIAL HISTORY:  Patient resides:  Independently    Assisted Living    SNF    With family care       Smoking history:   None    Former    Chronic      Alcohol history:   None    Social    Chronic      Ambulates:   Independently    w/cane    w/walker    w/wc    CODE STATUS:  DNR    Full    Other      Objective:     Physical Exam:     Visit Vitals  /77   Pulse 68   Temp 97.6 °F (36.4 °C)   Resp 16   Ht 5' 10\" (1.778 m)   Wt 89.8 kg (198 lb)   SpO2 97%   BMI 28.41 kg/m²           General:  Alert, cooperative, no distress, appears stated age. Head:  Normocephalic, without obvious abnormality, atraumatic. Eyes:  Conjunctivae/corneas clear. PERRL, EOMs intact. Nose: Nares normal.    Throat: Lips, mucosa, and tongue normal. Teeth and gums normal.   Neck: Supple   Back:   Symmetric, no curvature. ROM normal. No CVA tenderness. Lungs:   Clear to auscultation bilaterally. Chest wall:  No tenderness or deformity. Left upper chest pacemaker   Heart:  Regular rate and rhythm, S1, S2 normal, no murmur, click, rub or gallop. Abdomen:   Soft, non-tender. Bowel sounds normal. No masses,  No organomegaly. Extremities: Extremities normal, atraumatic, no cyanosis or edema. Pulses: 2+ and symmetric all extremities. Skin: Skin color, texture, turgor normal. No rashes or lesions   Neurologic: CNII-XII intact. No focal deficits         EKG:      Data Review:     Recent Days:  Recent Labs     12/08/22  1315   WBC 6.4   HGB 12.5   HCT 37.8        Recent Labs     12/08/22  1315      K 4.4   *   CO2 23   *   BUN 30*   CREA 1.94*   CA 9.0   ALB 3.8   ALT 24     No results for input(s): PH, PCO2, PO2, HCO3, FIO2 in the last 72 hours. 24 Hour Results:  Recent Results (from the past 24 hour(s))   SAMPLES BEING HELD    Collection Time: 12/08/22  1:15 PM   Result Value Ref Range    SAMPLES BEING HELD SST. RED     COMMENT        Add-on orders for these samples will be processed based on acceptable specimen integrity and analyte stability, which may vary by analyte.    METABOLIC PANEL, COMPREHENSIVE    Collection Time: 12/08/22  1:15 PM   Result Value Ref Range    Sodium 141 136 - 145 mmol/L    Potassium 4.4 3.5 - 5.1 mmol/L    Chloride 111 (H) 97 - 108 mmol/L    CO2 23 21 - 32 mmol/L    Anion gap 7 5 - 15 mmol/L    Glucose 111 (H) 65 - 100 mg/dL    BUN 30 (H) 6 - 20 MG/DL    Creatinine 1.94 (H) 0.70 - 1.30 MG/DL    BUN/Creatinine ratio 15 12 - 20      eGFR 34 (L) >60 ml/min/1.73m2    Calcium 9.0 8.5 - 10.1 MG/DL    Bilirubin, total 0.9 0.2 - 1.0 MG/DL    ALT (SGPT) 24 12 - 78 U/L    AST (SGOT) 16 15 - 37 U/L    Alk. phosphatase 125 (H) 45 - 117 U/L    Protein, total 7.0 6.4 - 8.2 g/dL    Albumin 3.8 3.5 - 5.0 g/dL    Globulin 3.2 2.0 - 4.0 g/dL    A-G Ratio 1.2 1.1 - 2.2     CBC WITH AUTOMATED DIFF    Collection Time: 12/08/22  1:15 PM   Result Value Ref Range    WBC 6.4 4.1 - 11.1 K/uL    RBC 3.94 (L) 4.10 - 5.70 M/uL    HGB 12.5 12.1 - 17.0 g/dL    HCT 37.8 36.6 - 50.3 %    MCV 95.9 80.0 - 99.0 FL    MCH 31.7 26.0 - 34.0 PG    MCHC 33.1 30.0 - 36.5 g/dL    RDW 13.5 11.5 - 14.5 %    PLATELET 748 925 - 616 K/uL    MPV 10.9 8.9 - 12.9 FL    NRBC 0.0 0  WBC    ABSOLUTE NRBC 0.00 0.00 - 0.01 K/uL    NEUTROPHILS 57 32 - 75 %    LYMPHOCYTES 27 12 - 49 %    MONOCYTES 10 5 - 13 %    EOSINOPHILS 5 0 - 7 %    BASOPHILS 1 0 - 1 %    IMMATURE GRANULOCYTES 0 0.0 - 0.5 %    ABS. NEUTROPHILS 3.7 1.8 - 8.0 K/UL    ABS. LYMPHOCYTES 1.7 0.8 - 3.5 K/UL    ABS. MONOCYTES 0.7 0.0 - 1.0 K/UL    ABS. EOSINOPHILS 0.3 0.0 - 0.4 K/UL    ABS. BASOPHILS 0.1 0.0 - 0.1 K/UL    ABS. IMM. GRANS. 0.0 0.00 - 0.04 K/UL    DF AUTOMATED           Imaging:     Assessment:     Principal Problem:    Dizziness (12/8/2022)    Active Problems:    Coronary atherosclerosis of native coronary artery (2/15/2011)      Mixed hyperlipidemia (2/15/2011)      Essential hypertension, benign (2/15/2011)      SLADE (acute kidney injury) ([facility] (12/8/2022)      CKD (chronic kidney disease) (12/8/2022)           Plan:     1. Dizziness: the aetiology is not particularly clear  BP is stable. We will perform EKG to rule out arrythmia's  Head CT scan negative  Unable to perform MRI of the brain due to pace maker  May need pacemaker interrogation  Obtain 2 d echo  Check orthostasis    2. SLADE on CKD stage 3   Likely due to lasix.  Hold home lasix  Gentle IVF hydration    3. CAD s/p stents and CABG  Resume home aspirin and lipitor    4. Afib s/p ablation    5. DVT PPX: Heparin    Dispo:  Admit patient to tele on observation         Signed By: Gwen Burns MD     December 8, 2022

## 2022-12-08 NOTE — DISCHARGE INSTRUCTIONS
Discharge Instructions       PATIENT ID: Mary Squires  MRN: [id number]   YOB: 1940    DATE OF ADMISSION: 12/8/2022  1:57 PM    DATE OF DISCHARGE: 12/9/2022    PRIMARY CARE PROVIDER: Charles Harris MD       DISCHARGING PHYSICIAN: Brittny Elise MD    To contact this individual call [phone] and ask the  to page. If unavailable ask to be transferred the Adult Hospitalist Department. DISCHARGE DIAGNOSES Dizziness, CAD s/p CABG    CONSULTATIONS: IP CONSULT TO CARDIOLOGY    PROCEDURES/SURGERIES: * No surgery found *    PENDING TEST RESULTS:   At the time of discharge the following test results are still pending: Transthoracic Echocardiogram    FOLLOW UP APPOINTMENTS:   Follow-up Information       Follow up With Specialties Details Why Contact Info    Charles Harris MD [place] [phone]               ADDITIONAL CARE RECOMMENDATIONS: Follow up with cardiology within 1 week    DIET: Cardiac Diet    ACTIVITY: Activity as tolerated    WOUND CARE:     EQUIPMENT needed:       DISCHARGE MEDICATIONS:   See Medication Reconciliation Form    It is important that you take the medication exactly as they are prescribed. Keep your medication in the bottles provided by the pharmacist and keep a list of the medication names, dosages, and times to be taken in your wallet. Do not take other medications without consulting your doctor. NOTIFY YOUR PHYSICIAN FOR ANY OF THE FOLLOWING:   Fever over 101 degrees for 24 hours. Chest pain, shortness of breath, fever, chills, nausea, vomiting, diarrhea, change in mentation, falling, weakness, bleeding. Severe pain or pain not relieved by medications. Or, any other signs or symptoms that you may have questions about.       DISPOSITION:   x Home With:   OT  PT  HH  RN       SNF/Inpatient Rehab/LTAC    Independent/assisted living    Hospice    Other:     CDMP Checked:   Yes x Signed:   David Mcnally MD  12/9/2022  11:51 AM

## 2022-12-08 NOTE — ED PROVIDER NOTES
45-year-old male with history of atrial fibrillation coronary artery disease with CABG and stents presents to the emergency department with chief complaint of increased fatigue over the last several weeks. Patient is also reporting that he feels extremely unsteady and off balance. This occasionally worsens with position he attempted to play golf today and was noted by his friends that he was very unsteady on his feet. Does admit to daily alcohol use but denies any increase in alcohol consumption and has not had a drink since yesterday. He mainly came in today because his fatigue is more significant, he noted that the symptoms started after he started taking Lasix    The history is provided by the patient. Dizziness  This is a new problem. The current episode started more than 2 days ago. Primary symptoms include loss of balance. Pertinent negatives include no focal weakness, no loss of sensation, no slurred speech, no speech difficulty, no memory loss, no movement disorder, no agitation, no visual change, no auditory change, no mental status change, no unresponsiveness and no disorientation. There has been no fever. Pertinent negatives include no shortness of breath and no chest pain.  There were no medications administered prior to arrival.      Past Medical History:   Diagnosis Date    Arrhythmia     hx afib    Arthritis     Cancer ([address]     basal & squamous cell removed   chest & arms    Chronic pain     neck & shoulders    Coronary atherosclerosis of native coronary artery 2/15/2011    2 sets stents & CABG    Essential hypertension, benign 2/15/2011    GERD (gastroesophageal reflux disease)     Heart failure ([address]     Mixed hyperlipidemia 2/15/2011    Renal insufficiency 03/04/2019    Cr 1.57       Past Surgical History:   Procedure Laterality Date    ABDOMEN SURGERY PROC UNLISTED  2009    left inguinal hernia repair    CARDIAC SURG PROCEDURE UNLIST  2009    CABG    CARDIAC SURG PROCEDURE UNLIST  2003 stents x 4    CARDIAC SURG PROCEDURE UNLIST      stents x 4    CARDIAC SURG PROCEDURE UNLIST  2018    ablation    CARDIAC SURG PROCEDURE UNLIST  2018    ablation for afib    HX HEENT  2018    cataract surgery bilat c no lens implnts    HX HEENT  2018    laser surgery to correct visual problems after caratact surgery    HX ORTHOPAEDIC Bilateral     partial knee replacemets bilat    HX PACEMAKER  2019    pacemaker inserted    HX UROLOGICAL      vasectomy         No family history on file. Social History     Socioeconomic History    Marital status:      Spouse name: Not on file    Number of children: Not on file    Years of education: Not on file    Highest education level: Not on file   Occupational History    Not on file   Tobacco Use    Smoking status: Former     Packs/day: 1.00     Years: 6.00     Pack years: 6.00     Types: Cigarettes     Quit date:      Years since quittin.9    Smokeless tobacco: Never   Substance and Sexual Activity    Alcohol use: Yes     Alcohol/week: 3.0 standard drinks     Types: 1 Shots of liquor, 1 Cans of beer, 1 Glasses of wine per week    Drug use: No    Sexual activity: Not on file   Other Topics Concern    Not on file   Social History Narrative    Not on file     Social Determinants of Health     Financial Resource Strain: Not on file   Food Insecurity: Not on file   Transportation Needs: Not on file   Physical Activity: Not on file   Stress: Not on file   Social Connections: Not on file   Intimate Partner Violence: Not on file   Housing Stability: Not on file         ALLERGIES: Meloxicam and Omeprazole    Review of Systems   Constitutional:  Positive for fatigue. Negative for activity change, appetite change and chills. Respiratory:  Negative for chest tightness and shortness of breath. Cardiovascular:  Negative for chest pain, palpitations and leg swelling. Gastrointestinal:  Negative for abdominal pain.    Genitourinary:  Negative for difficulty urinating. Musculoskeletal:  Negative for arthralgias. Skin:  Negative for rash. Neurological:  Positive for dizziness, weakness, light-headedness and loss of balance. Negative for focal weakness and speech difficulty. Psychiatric/Behavioral:  Negative for agitation and memory loss. All other systems reviewed and are negative. Vitals:    12/08/22 1312   BP: 133/77   Pulse: 68   Resp: 16   Temp: 97.6 °F (36.4 °C)   SpO2: 97%   Weight: 89.8 kg (198 lb)   Height: 5' 10\" (1.778 m)            Physical Exam  Vitals and nursing note reviewed. Constitutional:       Comments: Appears mildly ill and extremely fatigued,   HENT:      Head: Normocephalic and atraumatic. Nose: Nose normal.   Eyes:      Extraocular Movements: Extraocular movements intact. Pupils: Pupils are equal, round, and reactive to light. Cardiovascular:      Rate and Rhythm: Normal rate and regular rhythm. Abdominal:      General: Abdomen is flat. Bowel sounds are normal.   Musculoskeletal:         General: Normal range of motion. Cervical back: Normal range of motion and neck supple. Skin:     General: Skin is warm and dry. Neurological:      Mental Status: He is alert. Comments: Negative Patricia Bad maneuvar, slight drift left arm with cerebellar testing but able to maintain against gravity        MDM    ED Course as of 12/08/22 1443   Thu Dec 08, 2022   1400 EKG: Sinus rhythm with a ventricular rate of 95, , QRS 84, QTc 449, artifact is present, ST changes   [KP]   1401 EKG: Ventricular paced rhythm with frequent PVCs, ventricular rate of 74, , QTc 510   [KP]      ED Course User Index  [KP] Elicia Caldera DO       Procedures    2:46 PM  Advised patient of current labs, awaiting CT scan.   He appears extremely fatigued and requires assistance with ambulation        CBC showed a white count of 6.4, hemoglobin of 12.5, hematocrit 37.8, platelets of 467, edema was 141 potassium 4.4, elevated BUN of 30, elevated creatinine of 1.94, and slight elevation of alk phosphatase      CT scan shows microangiopathic changes,    25-year-old male presents to the emergency department with chief complaint of extreme fatigue, generalized weakness and imbalance issues. Patient states that he attempted to golf today and his friends even commented on how he appeared weak and unsteady. He has significant risk factors to include coronary artery disease, stent placement, atrial fibrillation. He states that the symptoms started approximately 3 weeks ago and shortly after he started Lasix. CBC was within normal limits, patient showed elevation in creatinine from baseline to 1.94.  UA still pending. Attempted to ambulate patient but he is extremely weak without lateralizing signs. Perfect Serve Consult for Admission  3:28 PM    ED Room Number: CW/CW  Patient Name and age:  Sanjay Rodriguez 80 y.o.  male  Working Diagnosis:   1. Acute renal failure, unspecified acute renal failure type ([address]    2. Weakness    3.  Dizziness        COVID-19 Suspicion:  no  Sepsis present:  no  Reassessment needed: no  Code Status:  Full Code  Readmission: no  Isolation Requirements:  no  Recommended Level of Care:  telemetry  Department:[facility] ED - [phone]  Other:      Perfect serve sent at 3:29

## 2022-12-09 ENCOUNTER — APPOINTMENT (OUTPATIENT)
Dept: NON INVASIVE DIAGNOSTICS | Age: 82
End: 2022-12-09
Attending: HOSPITALIST
Payer: MEDICARE

## 2022-12-09 VITALS
RESPIRATION RATE: 18 BRPM | SYSTOLIC BLOOD PRESSURE: 140 MMHG | HEART RATE: 63 BPM | DIASTOLIC BLOOD PRESSURE: 73 MMHG | BODY MASS INDEX: 28.34 KG/M2 | OXYGEN SATURATION: 98 % | TEMPERATURE: 97.6 F | HEIGHT: 70 IN | WEIGHT: 197.97 LBS

## 2022-12-09 LAB
ANION GAP SERPL CALC-SCNC: 7 MMOL/L (ref 5–15)
APPEARANCE UR: CLEAR
BASOPHILS # BLD: 0.1 K/UL (ref 0–0.1)
BASOPHILS NFR BLD: 1 % (ref 0–1)
BILIRUB UR QL: NEGATIVE
BUN SERPL-MCNC: 28 MG/DL (ref 6–20)
BUN/CREAT SERPL: 15 (ref 12–20)
CALCIUM SERPL-MCNC: 9.2 MG/DL (ref 8.5–10.1)
CHLORIDE SERPL-SCNC: 112 MMOL/L (ref 97–108)
CO2 SERPL-SCNC: 22 MMOL/L (ref 21–32)
COLOR UR: NORMAL
CREAT SERPL-MCNC: 1.93 MG/DL (ref 0.7–1.3)
CREAT UR-MCNC: 65 MG/DL
DIFFERENTIAL METHOD BLD: ABNORMAL
ECHO AO ARCH DIAM: 3.2 CM
ECHO AO ASC DIAM: 3.8 CM
ECHO AO ASCENDING AORTA INDEX: 1.83 CM/M2
ECHO AV AREA PEAK VELOCITY: 1.3 CM2
ECHO AV AREA VTI: 1.4 CM2
ECHO AV AREA/BSA PEAK VELOCITY: 0.6 CM2/M2
ECHO AV AREA/BSA VTI: 0.7 CM2/M2
ECHO AV MEAN GRADIENT: 4 MMHG
ECHO AV MEAN VELOCITY: 1 M/S
ECHO AV PEAK GRADIENT: 7 MMHG
ECHO AV PEAK VELOCITY: 1.4 M/S
ECHO AV VELOCITY RATIO: 0.36
ECHO AV VTI: 27 CM
ECHO EST RA PRESSURE: 5 MMHG
ECHO LA DIAMETER INDEX: 2.26 CM/M2
ECHO LA DIAMETER: 4.7 CM
ECHO LA VOL 2C: 74 ML (ref 18–58)
ECHO LA VOL 4C: 70 ML (ref 18–58)
ECHO LA VOL BP: 81 ML (ref 18–58)
ECHO LA VOL/BSA BIPLANE: 39 ML/M2 (ref 16–34)
ECHO LA VOLUME AREA LENGTH: 86 ML
ECHO LA VOLUME INDEX A2C: 36 ML/M2 (ref 16–34)
ECHO LA VOLUME INDEX A4C: 34 ML/M2 (ref 16–34)
ECHO LA VOLUME INDEX AREA LENGTH: 41 ML/M2 (ref 16–34)
ECHO LV E' LATERAL VELOCITY: 7 CM/S
ECHO LV E' SEPTAL VELOCITY: 5 CM/S
ECHO LV FRACTIONAL SHORTENING: 27 % (ref 28–44)
ECHO LV INTERNAL DIMENSION DIASTOLE INDEX: 2.88 CM/M2
ECHO LV INTERNAL DIMENSION DIASTOLIC: 6 CM (ref 4.2–5.9)
ECHO LV INTERNAL DIMENSION SYSTOLIC INDEX: 2.12 CM/M2
ECHO LV INTERNAL DIMENSION SYSTOLIC: 4.4 CM
ECHO LV IVSD: 1.1 CM (ref 0.6–1)
ECHO LV MASS 2D: 279.6 G (ref 88–224)
ECHO LV MASS INDEX 2D: 134.4 G/M2 (ref 49–115)
ECHO LV POSTERIOR WALL DIASTOLIC: 1.1 CM (ref 0.6–1)
ECHO LV RELATIVE WALL THICKNESS RATIO: 0.37
ECHO LVOT AREA: 3.5 CM2
ECHO LVOT AV VTI INDEX: 0.43
ECHO LVOT DIAM: 2.1 CM
ECHO LVOT MEAN GRADIENT: 1 MMHG
ECHO LVOT PEAK GRADIENT: 1 MMHG
ECHO LVOT PEAK VELOCITY: 0.5 M/S
ECHO LVOT STROKE VOLUME INDEX: 19.1 ML/M2
ECHO LVOT SV: 39.8 ML
ECHO LVOT VTI: 11.5 CM
ECHO MV A VELOCITY: 0.31 M/S
ECHO MV E DECELERATION TIME (DT): 244.4 MS
ECHO MV E VELOCITY: 1.05 M/S
ECHO MV E/A RATIO: 3.39
ECHO MV E/E' LATERAL: 15
ECHO MV E/E' RATIO (AVERAGED): 18
ECHO MV E/E' SEPTAL: 21
ECHO MV REGURGITANT PEAK GRADIENT: 41 MMHG
ECHO MV REGURGITANT PEAK VELOCITY: 3.2 M/S
ECHO PULMONARY ARTERY END DIASTOLIC PRESSURE: 12 MMHG
ECHO PV MAX VELOCITY: 0.7 M/S
ECHO PV PEAK GRADIENT: 2 MMHG
ECHO PV REGURGITANT MAX VELOCITY: 1.7 M/S
ECHO RIGHT VENTRICULAR SYSTOLIC PRESSURE (RVSP): 41 MMHG
ECHO RV FREE WALL PEAK S': 11 CM/S
ECHO RV INTERNAL DIMENSION: 5.4 CM
ECHO RV TAPSE: 1.8 CM (ref 1.7–?)
ECHO RVOT PEAK GRADIENT: 2 MMHG
ECHO RVOT PEAK VELOCITY: 0.6 M/S
ECHO TV REGURGITANT MAX VELOCITY: 3.02 M/S
ECHO TV REGURGITANT PEAK GRADIENT: 36 MMHG
EOSINOPHIL # BLD: 0.4 K/UL (ref 0–0.4)
EOSINOPHIL NFR BLD: 6 % (ref 0–7)
ERYTHROCYTE [DISTWIDTH] IN BLOOD BY AUTOMATED COUNT: 13.6 % (ref 11.5–14.5)
GLUCOSE SERPL-MCNC: 99 MG/DL (ref 65–100)
GLUCOSE UR STRIP.AUTO-MCNC: NEGATIVE MG/DL
HCT VFR BLD AUTO: 38.3 % (ref 36.6–50.3)
HGB BLD-MCNC: 12.6 G/DL (ref 12.1–17)
HGB UR QL STRIP: NEGATIVE
IMM GRANULOCYTES # BLD AUTO: 0 K/UL (ref 0–0.04)
IMM GRANULOCYTES NFR BLD AUTO: 0 % (ref 0–0.5)
KETONES UR QL STRIP.AUTO: NEGATIVE MG/DL
LEUKOCYTE ESTERASE UR QL STRIP.AUTO: NEGATIVE
LYMPHOCYTES # BLD: 2 K/UL (ref 0.8–3.5)
LYMPHOCYTES NFR BLD: 30 % (ref 12–49)
MAGNESIUM SERPL-MCNC: 2.3 MG/DL (ref 1.6–2.4)
MCH RBC QN AUTO: 31.7 PG (ref 26–34)
MCHC RBC AUTO-ENTMCNC: 32.9 G/DL (ref 30–36.5)
MCV RBC AUTO: 96.5 FL (ref 80–99)
MONOCYTES # BLD: 0.6 K/UL (ref 0–1)
MONOCYTES NFR BLD: 9 % (ref 5–13)
NEUTS SEG # BLD: 3.6 K/UL (ref 1.8–8)
NEUTS SEG NFR BLD: 54 % (ref 32–75)
NITRITE UR QL STRIP.AUTO: NEGATIVE
NRBC # BLD: 0 K/UL (ref 0–0.01)
NRBC BLD-RTO: 0 PER 100 WBC
PH UR STRIP: 6 [PH] (ref 5–8)
PLATELET # BLD AUTO: 176 K/UL (ref 150–400)
PMV BLD AUTO: 11.3 FL (ref 8.9–12.9)
POTASSIUM SERPL-SCNC: 4.2 MMOL/L (ref 3.5–5.1)
PROT UR STRIP-MCNC: NEGATIVE MG/DL
RBC # BLD AUTO: 3.97 M/UL (ref 4.1–5.7)
SODIUM SERPL-SCNC: 141 MMOL/L (ref 136–145)
SODIUM UR-SCNC: 89 MMOL/L
SP GR UR REFRACTOMETRY: 1.01 (ref 1–1.03)
UROBILINOGEN UR QL STRIP.AUTO: 1 EU/DL (ref 0.2–1)
WBC # BLD AUTO: 6.5 K/UL (ref 4.1–11.1)

## 2022-12-09 PROCEDURE — 83735 ASSAY OF MAGNESIUM: CPT

## 2022-12-09 PROCEDURE — 96372 THER/PROPH/DIAG INJ SC/IM: CPT

## 2022-12-09 PROCEDURE — G0378 HOSPITAL OBSERVATION PER HR: HCPCS

## 2022-12-09 PROCEDURE — 80048 BASIC METABOLIC PNL TOTAL CA: CPT

## 2022-12-09 PROCEDURE — 84300 ASSAY OF URINE SODIUM: CPT

## 2022-12-09 PROCEDURE — 93306 TTE W/DOPPLER COMPLETE: CPT

## 2022-12-09 PROCEDURE — 36415 COLL VENOUS BLD VENIPUNCTURE: CPT

## 2022-12-09 PROCEDURE — 93306 TTE W/DOPPLER COMPLETE: CPT | Performed by: INTERNAL MEDICINE

## 2022-12-09 PROCEDURE — 74011250637 HC RX REV CODE- 250/637: Performed by: HOSPITALIST

## 2022-12-09 PROCEDURE — 82570 ASSAY OF URINE CREATININE: CPT

## 2022-12-09 PROCEDURE — 85025 COMPLETE CBC W/AUTO DIFF WBC: CPT

## 2022-12-09 PROCEDURE — 74011250636 HC RX REV CODE- 250/636: Performed by: INTERNAL MEDICINE

## 2022-12-09 PROCEDURE — 81003 URINALYSIS AUTO W/O SCOPE: CPT

## 2022-12-09 PROCEDURE — 99205 OFFICE O/P NEW HI 60 MIN: CPT | Performed by: INTERNAL MEDICINE

## 2022-12-09 RX ADMIN — ATORVASTATIN CALCIUM 20 MG: 20 TABLET, FILM COATED ORAL at 00:09

## 2022-12-09 RX ADMIN — HEPARIN SODIUM 5000 UNITS: 5000 INJECTION INTRAVENOUS; SUBCUTANEOUS at 08:14

## 2022-12-09 RX ADMIN — HEPARIN SODIUM 5000 UNITS: 5000 INJECTION INTRAVENOUS; SUBCUTANEOUS at 00:09

## 2022-12-09 RX ADMIN — SODIUM CHLORIDE, POTASSIUM CHLORIDE, SODIUM LACTATE AND CALCIUM CHLORIDE 75 ML/HR: 600; 310; 30; 20 INJECTION, SOLUTION INTRAVENOUS at 00:11

## 2022-12-09 RX ADMIN — ASPIRIN 81 MG: 81 TABLET, COATED ORAL at 08:14

## 2022-12-09 RX ADMIN — ASPIRIN 81 MG: 81 TABLET, COATED ORAL at 00:09

## 2022-12-09 NOTE — ED NOTES
Echo and cardiology at bedside. Per cardiology, pt cleared for discharge. Dr. Ronnie Levin notified.

## 2022-12-09 NOTE — ED NOTES
Pt pulled out IV and left ER ambulatory with steady gait. Stated, \"doctor can mail me discharge papers\". Dr. Patricia Luo notified.

## 2022-12-09 NOTE — PROGRESS NOTES
[facility]  Cardiology Care Note                    [x]Initial visit     []Established visit     Patient Name: Rand Isabel  Primary Cardiologist: none  Consulting Cardiologist: Edda Monroe MD     Reason for initial visit: Dizziness, PPM, AFIB    HPI:   19-year-old male with past medical history significant for CAD, CABG, stents, atrial fibrillation status post ablation, presented with symptoms of dizziness after he was started on 20 mg of Lasix for nocturia. Since starting Lasix he has been feeling dizziness every day. No symptoms of chest pain, shortness of breath, palpitations or passing out episodes. On presentation he was wobbly and could hardly stand due to dizziness specially when he was playing golf therefore he came to the ER. No symptoms of fever, chills, cough, abdominal pain, diarrhea, dysuria or constipation. EKG at the time of presentation demonstrated ventricular paced rhythm with PVCs. Assessment and Plan       Dizziness: Appears to be noncardiac in nature. Symptoms of dizziness started mostly when he was started on diuretics for nocturia. May be hold furosemide for few days and see if he will improve. We will perform pacemaker check. Echo demonstrated preserved left ventricular function 2. And moderate mitral regurgitation which are not the cause for his dizziness. History of CAD/CABG/stents: Continue medical management. History of paroxysmal atrial fibrillation status post ablation and permanent pacemaker. Follows Dr. Davian Martinez and Dr. Alina Calle. Not on anticoagulation.   SLADE             ____________________________________________________________    Cardiac testing  @xxecho@          Review of Systems    [x]All other systems reviewed and all negative except as written in HPI    [] Patient unable to provide secondary to condition      Past Medical History:   Diagnosis Date    Arrhythmia     hx afib    Arthritis     Cancer ([facility] [address]     basal & squamous cell removed   chest & arms    Chronic pain     neck & shoulders    Coronary atherosclerosis of native coronary artery 2/15/2011    2 sets stents & CABG    Essential hypertension, benign 2/15/2011    GERD (gastroesophageal reflux disease)     Heart failure ([facility] [address]     Mixed hyperlipidemia 2/15/2011    Renal insufficiency 03/04/2019    Cr 1.57     Past Surgical History:   Procedure Laterality Date    ABDOMEN SURGERY PROC UNLISTED  2009    left inguinal hernia repair    CARDIAC SURG PROCEDURE UNLIST  2009    CABG    CARDIAC SURG PROCEDURE UNLIST  2003    stents x 4    CARDIAC SURG PROCEDURE UNLIST  2007    stents x 4    CARDIAC SURG PROCEDURE UNLIST  02/2018    ablation    CARDIAC SURG PROCEDURE UNLIST  06/2018    ablation for afib    HX HEENT  02/2018    cataract surgery bilat c no lens implnts    HX HEENT  03/2018    laser surgery to correct visual problems after caratact surgery    HX ORTHOPAEDIC Bilateral 2007    partial knee replacemets bilat    HX PACEMAKER  02/14/2019    pacemaker inserted    HX UROLOGICAL      vasectomy     Social Hx:  reports that he quit smoking about 58 years ago. He has a 6.00 pack-year smoking history. He has never used smokeless tobacco. He reports current alcohol use of about 3.0 standard drinks per week. He reports that he does not use drugs. Family Hx: family history is not on file. Allergies   Allergen Reactions    Meloxicam Other (comments)     Fast heart beat    Omeprazole Other (comments)     States causes \"weakness. \"          OBJECTIVE:  Wt Readings from Last 3 Encounters:   12/08/22 198 lb (89.8 kg)   04/30/22 195 lb (88.5 kg)   07/06/21 195 lb (88.5 kg)     No intake or output data in the 24 hours ending 12/09/22 0936      Physical Exam    Vitals:   Vitals:    12/09/22 0026 12/09/22 0254 12/09/22 0508 12/09/22 0800   BP: 122/68 (!) 143/84 (!) 142/78 (!) 140/73   Pulse: 65 62 64 63   Resp: 16  18 18   Temp: 97.8 °F (36.6 °C)  97.9 °F (36.6 °C) 97.6 °F (36.4 °C)   SpO2: 97% 97% 98% 98%   Weight:       Height:           General:    Alert, cooperative, no distress, appears stated age. Neck:   Supple, no carotid bruit and no JVD. Back:     Symmetric,    Lungs:     Clear to auscultation bilaterally. Heart[de-identified]    Regular rate and rhythm, S1, S2 normal, no murmur, click, rub or gallop. Abdomen:     Soft, non-tender. Bowel sounds normal.    Extremities:   Extremities normal, atraumatic, no cyanosis or edema. Vascular:   Pulses - normal   Skin:   Skin color normal. No rashes or lesions on visible areas   Neurologic:   Alert, Moves all extremities. Data Review:     Radiology:   XR Results (most recent):  Results from Hospital Encounter encounter on 04/30/22    XR SHOULDER LT AP/LAT MIN 2 V    Narrative  EXAM: XR SHOULDER LT AP/LAT MIN 2 V    INDICATION: left shoulder pain. fall/injury. COMPARISON: None. FINDINGS: Three views of the left shoulder. The bones are osteopenic. The  acromioclavicular and glenohumeral joints are intact. No acute fracture or  dislocation. A left subclavian pacemaker is in place. The patient status post  median sternotomy. Impression  No acute abnormality. CT Results (most recent):  Results from Hospital Encounter encounter on 12/08/22    CT HEAD WO CONT    Narrative  EXAM:  CT HEAD WO CONT    INDICATION:   dizzy    COMPARISON: None. TECHNIQUE: Unenhanced CT of the head was performed using 5 mm images. Brain and  bone windows were generated. CT dose reduction was achieved through use of a  standardized protocol tailored for this examination and automatic exposure  control for dose modulation. FINDINGS:  The ventricles are normal in size and position. Scattered periventricular and  deep white matter hypodensities, consistent with mild chronic microangiopathic  ischemic changes. Basilar cisterns are patent. No midline shift.  There is no  evidence of acute infarct, hemorrhage, or extraaxial fluid collection. The paranasal sinuses, mastoid air cells, and middle ears are clear. The orbital  contents are within normal limits with bilateral lens implants. There are no  significant osseous or extracranial soft tissue lesions. Impression  1. No evidence of acute intracranial abnormality. MRI Results (most recent):  Results from [place] encounter on 08/10/18    MRI SHOULDER RT WO CONT    Narrative  EXAM:  MRI SHOULDER RT WO CONT    INDICATION: Shoulder pain    COMPARISON: None    TECHNIQUE: Axial proton density fat-saturated; oblique coronal T1, T2  fat-saturated, and proton density fat-saturated; and oblique sagittal T2  fat-saturated MRI of the right shoulder . CONTRAST: None. FINDINGS: A.C. joint: Moderate osteoarthritis with downward spur formation from  the distal clavicle. Anterior acromion process type: 3    Bone marrow: Small areas of cystic change in the greater tuberosity related to a  combination of rotator cuff tendinopathy and entering vasculature. No acute  fracture, dislocation, or marrow replacing process. Joint fluid: Minimal joint effusion. Small amount of fluid in the subacromial  and subdeltoid bursa. Rotator cuff tendons: Partial-thickness articular sided tearing is present in  the supraspinatus and infraspinatus tendons. There is medial delamination along  the undersurface of the supraspinatus tendon. Delamination is seen into the  midportion of the tendon in the infraspinatus. There is partial thickness  articular sided tearing in the superior fibers of the subscapularis. The teres  minor is intact. Biceps tendon: The long head of the biceps is partially subluxed into the  subscapularis tendon. There is partial tearing of the intra-articular biceps  tendon. Muscles: No edema or atrophy.     Glenoid labrum: There is increased signal seen in the superior to superior  posterior labrum related to tearing which is likely degenerative. Glenohumeral joint capsule: Inferior joint capsule is intact. Glenohumeral articular cartilage: Intact. No focal osteochondral lesion. Soft tissue mass: None. Impression  IMPRESSION:  1. Partial-thickness articular sided tearing in the supraspinatus and  infraspinatus insertions. Medial delamination is seen along the undersurface of  the supraspinatus. Medial delamination is seen within the infraspinatus tendon. 2. Partial-thickness articular sided tearing in the subscapularis tendon  allowing for the long head of the biceps tendon to medially sublux within the  bicipital groove. There is a partial-thickness tearing in the intra-articular  biceps tendon. 3. Tearing in the superior to superior posterior labrum which is likely  degenerative. 4. Small amount of fluid in the subacromial and subdeltoid bursa. 5. Moderate acromioclavicular osteoarthritis. No results for input(s): CPK, TROIQ in the last 72 hours. No lab exists for component: CKQMB, CPKMB, BMPP  Recent Labs     12/09/22  0251 12/08/22  1315    141   K 4.2 4.4   * 111*   CO2 22 23   BUN 28* 30*   CREA 1.93* 1.94*   GLU 99 111*   CA 9.2 9.0     Recent Labs     12/09/22  0251 12/08/22  1315   WBC 6.5 6.4   HGB 12.6 12.5   HCT 38.3 37.8    169     Recent Labs     12/08/22  1315   *     No results for input(s): CHOL, LDLC in the last 72 hours. No lab exists for component: TGL, HDLC,  HBA1C  No results for input(s): CRP, TSH, TSHEXT in the last 72 hours.     No lab exists for component: ESR        Current meds:    Current Facility-Administered Medications:     sodium chloride (NS) flush 5-40 mL, 5-40 mL, IntraVENous, Q8H, Almita Brower MD    sodium chloride (NS) flush 5-40 mL, 5-40 mL, IntraVENous, PRN, Almita Brower MD    acetaminophen (TYLENOL) tablet 650 mg, 650 mg, Oral, Q4H PRN, Almita Brower MD    HYDROcodone-acetaminophen (NORCO) 5-325 mg per tablet 1 Tablet, 1 Tablet, Oral, Q4H PRN, Bib Brower MD    heparin (porcine) injection 5,000 Units, 5,000 Units, SubCUTAneous, Q8H, Bib Brower MD, 5,000 Units at 12/09/22 0814    naloxone [facility]) injection 0.4 mg, 0.4 mg, IntraVENous, PRN, Romana Lively, MD    lactated Ringers infusion, 75 mL/hr, IntraVENous, CONTINUOUS, Bib Brower MD, Last Rate: 75 mL/hr at 12/09/22 0011, 75 mL/hr at 12/09/22 0011    aspirin delayed-release tablet 81 mg, 81 mg, Oral, BID, Tiffanie Bennett MD, 81 mg at 12/09/22 0814    atorvastatin (LIPITOR) tablet 20 mg, 20 mg, Oral, QHS, London Puckett MD, 20 mg at 12/09/22 0009    zolpidem (AMBIEN) tablet 5 mg, 5 mg, Oral, QHS, London Puckett MD, 5 mg at 12/08/22 2320    zolpidem (AMBIEN) tablet 5 mg, 5 mg, Oral, QHS PRN, London Puckett MD, 5 mg at 12/08/22 2317    Current Outpatient Medications:     lidocaine (LIDODERM) 5 %, Apply patch to the affected area for 12 hours a day and remove for 12 hours a day., Disp: 30 Each, Rfl: 0    aspirin delayed-release 81 mg tablet, Take 1 Tab by mouth two (2) times a day., Disp: 60 Tab, Rfl: 0    senna-docusate (PERICOLACE) 8.6-50 mg per tablet, Take 1 Tab by mouth two (2) times a day., Disp: 60 Tab, Rfl: 0    ondansetron (ZOFRAN ODT) 4 mg disintegrating tablet, Take 1 Tab by mouth every eight (8) hours as needed for Nausea., Disp: 21 Tab, Rfl: 0    atorvastatin (LIPITOR) 40 mg tablet, Take 40 mg by mouth nightly., Disp: , Rfl:     valsartan (DIOVAN) 160 mg tablet, Take 160 mg by mouth daily. , Disp: , Rfl:     zolpidem (AMBIEN) 10 mg tablet, Take 10 mg by mouth nightly., Disp: , Rfl:     carvedilol (COREG) 3.125 mg tablet, Take 3.125 mg by mouth nightly., Disp: , Rfl:     pyridoxine, vitamin B6, (VITAMIN B-6) 100 mg tablet, Take 100 mg by mouth daily. , Disp: , Rfl:     MD Edgar Nicolas MD

## 2022-12-09 NOTE — DISCHARGE SUMMARY
Discharge Summary       PATIENT ID: Kerry Barrow  MRN: [id number]   YOB: 1940    DATE OF ADMISSION: 12/8/2022  1:57 PM    DATE OF DISCHARGE: 12/9/2022  PRIMARY CARE PROVIDER: Elizabeth Peter MD       DISCHARGING PHYSICIAN: Corey Salgado MD    To contact this individual call [phone] and ask the  to page. If unavailable ask to be transferred the Adult Hospitalist Department. CONSULTATIONS: IP CONSULT TO CARDIOLOGY    PROCEDURES/SURGERIES: * No surgery found *    ADMITTING [address] [place] [address] COURSE:     Kerry Barrow is a 80 y.o. male with past medical history of CAD s/p stents and CABG, afib s/p ablation, who presents to the ED with the c/o dizziness. Patient reports that about 4-5 weeks ago he was started on 20 mg lasix due to nocturia. He says since then he has been having dizzy spells which has been ongoing over the last 4-5 weeks. This morning while playing golf he was wobbly and could hardly stand. He left and went home and was brought to the ER by a relative. Head CT scan perform was negative without any acute findings. Blood work done show Scr 1.94 down to 1.93 on the day of discharge  Transthoracic Echo performed result is pending  Patient was evaluated by cardiology. Patient was eager to be discharged and ripped out I.V line and walked out the ER. A/P  1. Dizziness: the aetiology is not particularly clear. Suspect possible intravascular volume depletion  BP is stable. Head CT scan negative  Unable to perform MRI of the brain due to pace maker  May need pacemaker interrogation  Echo performed result is pending     2. SLADE on CKD stage 3   Likely due to lasix. Hold home lasix  Hydrated with ringers lactate IVF. Scr down to 1.93 from 1.94  Recommend outpatient follow up with nephrology     3. CAD s/p stents and CABG  Continue home aspirin and lipitor     4.  Afib s/p ablation  Outpatient follow up with cardiology for pace maker interrogation to rule out arrythmia's      DISCHARGE DIAGNOSES / PLAN:      As above       PENDING TEST RESULTS:   At the time of discharge the following test results are still pending:     FOLLOW UP APPOINTMENTS:    Follow-up Information       Follow up With Specialties Details Why Contact Info    Venita Contreras [address]  [phone]               ADDITIONAL CARE RECOMMENDATIONS:     DIET: Cardiac Diet    ACTIVITY: Activity as tolerated    WOUND CARE:     EQUIPMENT needed:       DISCHARGE MEDICATIONS:  Current Discharge Medication List        CONTINUE these medications which have NOT CHANGED    Details   lidocaine (LIDODERM) 5 % Apply patch to the affected area for 12 hours a day and remove for 12 hours a day. Qty: 30 Each, Refills: 0      aspirin delayed-release 81 mg tablet Take 1 Tab by mouth two (2) times a day. Qty: 60 Tab, Refills: 0      senna-docusate (PERICOLACE) 8.6-50 mg per tablet Take 1 Tab by mouth two (2) times a day. Qty: 60 Tab, Refills: 0      ondansetron (ZOFRAN ODT) 4 mg disintegrating tablet Take 1 Tab by mouth every eight (8) hours as needed for Nausea. Qty: 21 Tab, Refills: 0      atorvastatin (LIPITOR) 40 mg tablet Take 40 mg by mouth nightly. zolpidem (AMBIEN) 10 mg tablet Take 10 mg by mouth nightly. carvedilol (COREG) 3.125 mg tablet Take 3.125 mg by mouth nightly. pyridoxine, vitamin B6, (VITAMIN B-6) 100 mg tablet Take 100 mg by mouth daily. STOP taking these medications       valsartan (DIOVAN) 160 mg tablet Comments:   Reason for Stopping:                 NOTIFY YOUR PHYSICIAN FOR ANY OF THE FOLLOWING:   Fever over 101 degrees for 24 hours. Chest pain, shortness of breath, fever, chills, nausea, vomiting, diarrhea, change in mentation, falling, weakness, bleeding. Severe pain or pain not relieved by medications. Or, any other signs or symptoms that you may have questions about.     DISPOSITION:  x  Home With:   OT  PT HH  RN       Long term SNF/Inpatient Rehab    Independent/assisted living    Hospice    Other:       PATIENT CONDITION AT DISCHARGE:     Functional status    Poor     Deconditioned    x Independent      Cognition    x Lucid     Forgetful     Dementia      Catheters/lines (plus indication)    Pineda     PICC     PEG    x None      Code status    x Full code     DNR      PHYSICAL EXAMINATION AT DISCHARGE:   Refer to Progress Note  Not examined    CHRONIC MEDICAL DIAGNOSES:  Problem List as of 12/9/2022 Date Reviewed: 12/8/2022            Codes Class Noted - Resolved    * (Principal) Dizziness ICD-10-CM: R42  ICD-9-CM: 780.4  12/8/2022 - Present        SLADE (acute kidney injury) ([facility] [address] ICD-10-CM: N17.9  ICD-9-CM: 584.9  12/8/2022 - Present        CKD (chronic kidney disease) ICD-10-CM: N18.9  ICD-9-CM: 585.9  12/8/2022 - Present        Osteoarthritis of right knee ICD-10-CM: M17.11  ICD-9-CM: 715.96  3/12/2019 - Present        Coronary atherosclerosis of native coronary artery ICD-10-CM: I25.10  ICD-9-CM: 414.01  2/15/2011 - Present        Mixed hyperlipidemia ICD-10-CM: E78.2  ICD-9-CM: 272.2  2/15/2011 - Present        Essential hypertension, benign ICD-10-CM: I10  ICD-9-CM: 401.1  2/15/2011 - Present           Greater than 30 minutes were spent with the patient on counseling and coordination of care    Signed:   Lisa Dalton MD  12/9/2022  11:41 AM

## 2022-12-12 LAB
ATRIAL RATE: 48 BPM
CALCULATED R AXIS, ECG10: -68 DEGREES
CALCULATED T AXIS, ECG11: 105 DEGREES
DIAGNOSIS, 93000: NORMAL
Q-T INTERVAL, ECG07: 460 MS
QRS DURATION, ECG06: 168 MS
QTC CALCULATION (BEZET), ECG08: 510 MS
VENTRICULAR RATE, ECG03: 74 BPM

## 2023-06-07 ENCOUNTER — HOSPITAL ENCOUNTER (EMERGENCY)
Facility: HOSPITAL | Age: 83
Discharge: HOME | End: 2023-06-07
Attending: STUDENT IN AN ORGANIZED HEALTH CARE EDUCATION/TRAINING PROGRAM
Payer: MEDICARE

## 2023-06-07 ENCOUNTER — APPOINTMENT (OUTPATIENT)
Facility: HOSPITAL | Age: 83
End: 2023-06-07
Payer: MEDICARE

## 2023-06-07 VITALS
RESPIRATION RATE: 18 BRPM | OXYGEN SATURATION: 97 % | BODY MASS INDEX: 28.63 KG/M2 | SYSTOLIC BLOOD PRESSURE: 144 MMHG | HEIGHT: 70 IN | TEMPERATURE: 98 F | DIASTOLIC BLOOD PRESSURE: 95 MMHG | WEIGHT: 200 LBS | HEART RATE: 71 BPM

## 2023-06-07 DIAGNOSIS — M25.532 LEFT WRIST PAIN: Primary | ICD-10-CM

## 2023-06-07 PROCEDURE — 73110 X-RAY EXAM OF WRIST: CPT

## 2023-06-07 PROCEDURE — 99283 EMERGENCY DEPT VISIT LOW MDM: CPT

## 2023-06-07 ASSESSMENT — ENCOUNTER SYMPTOMS
NAUSEA: 0
SORE THROAT: 0
COUGH: 0
SHORTNESS OF BREATH: 0
DIARRHEA: 0
EYE PAIN: 0
VOMITING: 0
ABDOMINAL PAIN: 0

## 2023-06-07 NOTE — ED NOTES
Patient does not appear to be in any acute distress/shows no evidence of clinical instability at this time. Provider has reviewed discharge instructions with the patient/family. The patient/family verbalized understanding instructions as well as need for follow up for any further symptoms. Discharge papers given, education provided, and any questions answered. Patient discharged by provider.       Devan Brady RN  06/07/23 [id number]

## 2023-06-07 NOTE — DISCHARGE INSTRUCTIONS
Continue to monitor symptoms at home. Take Advil or motrin as needed for pain. Follow-up with orthopedics and PCP and return with any changes or worsening.

## 2023-06-07 NOTE — ED PROVIDER NOTES
as possible for a visit   As follow up in next week    [facility] EMERGENCY DEPT  [address]  [phone]  Go to   If symptoms worsen or change    [address]  [phone]  Schedule an appointment as soon as possible for a visit         DISCHARGE MEDICATIONS:  New Prescriptions    No medications on file         (Please note that portions of this note were completed with a voice recognition program.  Efforts were made to edit the dictations but occasionally words are mis-transcribed.)    MOHINDER Tsang (electronically signed)  Emergency Attending Physician / Physician Assistant / Nurse Practitioner             MOHINDER Arreola  06/07/23 7677

## 2023-06-07 NOTE — ED TRIAGE NOTES
Pt arrives to the ER for complaints of left wrist pain that he woke up with yesterday. Pt also reports some swelling and warmth. Denies any injury or trauma.

## 2024-06-15 ENCOUNTER — APPOINTMENT (OUTPATIENT)
Facility: HOSPITAL | Age: 84
DRG: 149 | End: 2024-06-15
Payer: MEDICARE

## 2024-06-15 ENCOUNTER — HOSPITAL ENCOUNTER (INPATIENT)
Facility: HOSPITAL | Age: 84
LOS: 1 days | Discharge: HOME | DRG: 149 | End: 2024-06-16
Attending: STUDENT IN AN ORGANIZED HEALTH CARE EDUCATION/TRAINING PROGRAM | Admitting: HOSPITALIST
Payer: MEDICARE

## 2024-06-15 DIAGNOSIS — R42 DIZZINESS: Primary | ICD-10-CM

## 2024-06-15 PROBLEM — I63.9 ACUTE CEREBROVASCULAR ACCIDENT (CVA) (HCC): Status: ACTIVE | Noted: 2024-06-15

## 2024-06-15 LAB
ALBUMIN SERPL-MCNC: 3.7 G/DL (ref 3.5–5)
ALBUMIN/GLOB SERPL: 1.1 (ref 1.1–2.2)
ALP SERPL-CCNC: 117 U/L (ref 45–117)
ALT SERPL-CCNC: 17 U/L (ref 12–78)
ANION GAP SERPL CALC-SCNC: 4 MMOL/L (ref 5–15)
AST SERPL-CCNC: 14 U/L (ref 15–37)
BASOPHILS # BLD: 0.1 K/UL (ref 0–0.1)
BASOPHILS NFR BLD: 1 % (ref 0–1)
BILIRUB SERPL-MCNC: 1.1 MG/DL (ref 0.2–1)
BUN SERPL-MCNC: 32 MG/DL (ref 6–20)
BUN/CREAT SERPL: 17 (ref 12–20)
CALCIUM SERPL-MCNC: 8.9 MG/DL (ref 8.5–10.1)
CHLORIDE SERPL-SCNC: 112 MMOL/L (ref 97–108)
CO2 SERPL-SCNC: 23 MMOL/L (ref 21–32)
CREAT SERPL-MCNC: 1.85 MG/DL (ref 0.7–1.3)
DIFFERENTIAL METHOD BLD: ABNORMAL
EOSINOPHIL # BLD: 0.3 K/UL (ref 0–0.4)
EOSINOPHIL NFR BLD: 5 % (ref 0–7)
ERYTHROCYTE [DISTWIDTH] IN BLOOD BY AUTOMATED COUNT: 15.1 % (ref 11.5–14.5)
GLOBULIN SER CALC-MCNC: 3.4 G/DL (ref 2–4)
GLUCOSE BLD STRIP.AUTO-MCNC: 114 MG/DL (ref 65–117)
GLUCOSE SERPL-MCNC: 128 MG/DL (ref 65–100)
HCT VFR BLD AUTO: 37 % (ref 36.6–50.3)
HGB BLD-MCNC: 11.5 G/DL (ref 12.1–17)
IMM GRANULOCYTES # BLD AUTO: 0 K/UL (ref 0–0.04)
IMM GRANULOCYTES NFR BLD AUTO: 0 % (ref 0–0.5)
INR PPP: 1.2 (ref 0.9–1.1)
LYMPHOCYTES # BLD: 1.5 K/UL (ref 0.8–3.5)
LYMPHOCYTES NFR BLD: 21 % (ref 12–49)
MCH RBC QN AUTO: 31 PG (ref 26–34)
MCHC RBC AUTO-ENTMCNC: 31.1 G/DL (ref 30–36.5)
MCV RBC AUTO: 99.7 FL (ref 80–99)
MONOCYTES # BLD: 0.8 K/UL (ref 0–1)
MONOCYTES NFR BLD: 11 % (ref 5–13)
NEUTS SEG # BLD: 4.3 K/UL (ref 1.8–8)
NEUTS SEG NFR BLD: 62 % (ref 32–75)
NRBC # BLD: 0 K/UL (ref 0–0.01)
NRBC BLD-RTO: 0 PER 100 WBC
PLATELET # BLD AUTO: 180 K/UL (ref 150–400)
PMV BLD AUTO: 10.9 FL (ref 8.9–12.9)
POTASSIUM SERPL-SCNC: 4.1 MMOL/L (ref 3.5–5.1)
PROT SERPL-MCNC: 7.1 G/DL (ref 6.4–8.2)
PROTHROMBIN TIME: 11.9 SEC (ref 9–11.1)
RBC # BLD AUTO: 3.71 M/UL (ref 4.1–5.7)
SERVICE CMNT-IMP: NORMAL
SODIUM SERPL-SCNC: 139 MMOL/L (ref 136–145)
TROPONIN I SERPL HS-MCNC: 34 NG/L (ref 0–76)
WBC # BLD AUTO: 7 K/UL (ref 4.1–11.1)

## 2024-06-15 PROCEDURE — 4A03X5D MEASUREMENT OF ARTERIAL FLOW, INTRACRANIAL, EXTERNAL APPROACH: ICD-10-PCS | Performed by: STUDENT IN AN ORGANIZED HEALTH CARE EDUCATION/TRAINING PROGRAM

## 2024-06-15 PROCEDURE — 6360000004 HC RX CONTRAST MEDICATION: Performed by: RADIOLOGY

## 2024-06-15 PROCEDURE — 6370000000 HC RX 637 (ALT 250 FOR IP): Performed by: HOSPITALIST

## 2024-06-15 PROCEDURE — 70450 CT HEAD/BRAIN W/O DYE: CPT

## 2024-06-15 PROCEDURE — 93005 ELECTROCARDIOGRAM TRACING: CPT | Performed by: NURSE PRACTITIONER

## 2024-06-15 PROCEDURE — 80053 COMPREHEN METABOLIC PANEL: CPT

## 2024-06-15 PROCEDURE — 85610 PROTHROMBIN TIME: CPT

## 2024-06-15 PROCEDURE — 0042T CT BRAIN PERFUSION: CPT

## 2024-06-15 PROCEDURE — 2580000003 HC RX 258: Performed by: HOSPITALIST

## 2024-06-15 PROCEDURE — 82962 GLUCOSE BLOOD TEST: CPT

## 2024-06-15 PROCEDURE — 2060000000 HC ICU INTERMEDIATE R&B

## 2024-06-15 PROCEDURE — 85025 COMPLETE CBC W/AUTO DIFF WBC: CPT

## 2024-06-15 PROCEDURE — 84484 ASSAY OF TROPONIN QUANT: CPT

## 2024-06-15 PROCEDURE — 70498 CT ANGIOGRAPHY NECK: CPT

## 2024-06-15 PROCEDURE — 99285 EMERGENCY DEPT VISIT HI MDM: CPT

## 2024-06-15 PROCEDURE — 36415 COLL VENOUS BLD VENIPUNCTURE: CPT

## 2024-06-15 RX ORDER — CARVEDILOL 3.12 MG/1
3.12 TABLET ORAL 2 TIMES DAILY WITH MEALS
Status: DISCONTINUED | OUTPATIENT
Start: 2024-06-15 | End: 2024-06-15

## 2024-06-15 RX ORDER — SODIUM CHLORIDE 0.9 % (FLUSH) 0.9 %
5-40 SYRINGE (ML) INJECTION PRN
Status: DISCONTINUED | OUTPATIENT
Start: 2024-06-15 | End: 2024-06-16 | Stop reason: HOSPADM

## 2024-06-15 RX ORDER — ASPIRIN 81 MG/1
81 TABLET ORAL DAILY
Status: DISCONTINUED | OUTPATIENT
Start: 2024-06-15 | End: 2024-06-16 | Stop reason: HOSPADM

## 2024-06-15 RX ORDER — ASPIRIN 81 MG/1
81 TABLET, CHEWABLE ORAL DAILY
Status: DISCONTINUED | OUTPATIENT
Start: 2024-06-15 | End: 2024-06-15

## 2024-06-15 RX ORDER — ASPIRIN 300 MG/1
300 SUPPOSITORY RECTAL DAILY
Status: DISCONTINUED | OUTPATIENT
Start: 2024-06-15 | End: 2024-06-15

## 2024-06-15 RX ORDER — VALSARTAN 40 MG/1
40 TABLET ORAL DAILY
Status: ON HOLD | COMMUNITY
Start: 2023-06-06 | End: 2024-06-16 | Stop reason: HOSPADM

## 2024-06-15 RX ORDER — FLUTICASONE PROPIONATE 50 MCG
2 SPRAY, SUSPENSION (ML) NASAL DAILY
COMMUNITY
Start: 2022-12-27

## 2024-06-15 RX ORDER — VALSARTAN 40 MG/1
40 TABLET ORAL DAILY
COMMUNITY

## 2024-06-15 RX ORDER — ATORVASTATIN CALCIUM 20 MG/1
80 TABLET, FILM COATED ORAL NIGHTLY
Status: DISCONTINUED | OUTPATIENT
Start: 2024-06-16 | End: 2024-06-16 | Stop reason: HOSPADM

## 2024-06-15 RX ORDER — SODIUM CHLORIDE 0.9 % (FLUSH) 0.9 %
5-40 SYRINGE (ML) INJECTION EVERY 12 HOURS SCHEDULED
Status: DISCONTINUED | OUTPATIENT
Start: 2024-06-15 | End: 2024-06-16 | Stop reason: HOSPADM

## 2024-06-15 RX ORDER — ONDANSETRON 4 MG/1
4 TABLET, ORALLY DISINTEGRATING ORAL EVERY 8 HOURS PRN
Status: DISCONTINUED | OUTPATIENT
Start: 2024-06-15 | End: 2024-06-15

## 2024-06-15 RX ORDER — ZOLPIDEM TARTRATE 5 MG/1
10 TABLET ORAL NIGHTLY PRN
Status: DISCONTINUED | OUTPATIENT
Start: 2024-06-15 | End: 2024-06-16 | Stop reason: HOSPADM

## 2024-06-15 RX ORDER — BUMETANIDE 0.5 MG/1
0.5 TABLET ORAL DAILY
COMMUNITY
Start: 2023-03-02

## 2024-06-15 RX ORDER — ENOXAPARIN SODIUM 100 MG/ML
40 INJECTION SUBCUTANEOUS DAILY
Status: DISCONTINUED | OUTPATIENT
Start: 2024-06-15 | End: 2024-06-15

## 2024-06-15 RX ORDER — SODIUM CHLORIDE 9 MG/ML
INJECTION, SOLUTION INTRAVENOUS PRN
Status: DISCONTINUED | OUTPATIENT
Start: 2024-06-15 | End: 2024-06-16 | Stop reason: HOSPADM

## 2024-06-15 RX ORDER — ONDANSETRON 2 MG/ML
4 INJECTION INTRAMUSCULAR; INTRAVENOUS EVERY 6 HOURS PRN
Status: DISCONTINUED | OUTPATIENT
Start: 2024-06-15 | End: 2024-06-15

## 2024-06-15 RX ORDER — ACETAMINOPHEN 325 MG/1
650 TABLET ORAL EVERY 6 HOURS PRN
COMMUNITY
Start: 2013-08-02

## 2024-06-15 RX ORDER — POLYETHYLENE GLYCOL 3350 17 G/17G
17 POWDER, FOR SOLUTION ORAL DAILY PRN
Status: DISCONTINUED | OUTPATIENT
Start: 2024-06-15 | End: 2024-06-16 | Stop reason: HOSPADM

## 2024-06-15 RX ORDER — ATORVASTATIN CALCIUM 20 MG/1
40 TABLET, FILM COATED ORAL NIGHTLY
Status: DISCONTINUED | OUTPATIENT
Start: 2024-06-15 | End: 2024-06-15

## 2024-06-15 RX ADMIN — ZOLPIDEM TARTRATE 10 MG: 5 TABLET ORAL at 22:09

## 2024-06-15 RX ADMIN — SODIUM CHLORIDE, PRESERVATIVE FREE 10 ML: 5 INJECTION INTRAVENOUS at 22:05

## 2024-06-15 RX ADMIN — ASPIRIN 81 MG: 81 TABLET, COATED ORAL at 16:09

## 2024-06-15 RX ADMIN — IOPAMIDOL 140 ML: 755 INJECTION, SOLUTION INTRAVENOUS at 12:05

## 2024-06-15 RX ADMIN — APIXABAN 2.5 MG: 2.5 TABLET, FILM COATED ORAL at 22:05

## 2024-06-15 ASSESSMENT — PAIN SCALES - GENERAL: PAINLEVEL_OUTOF10: 0

## 2024-06-15 ASSESSMENT — PAIN - FUNCTIONAL ASSESSMENT: PAIN_FUNCTIONAL_ASSESSMENT: 0-10

## 2024-06-15 ASSESSMENT — PAIN SCALES - WONG BAKER: WONGBAKER_NUMERICALRESPONSE: NO HURT

## 2024-06-15 NOTE — CONSULTS
[facility]     Physical Exam    Patient Vitals for the past 12 hrs:   Temp Pulse Resp BP SpO2   06/15/24 1516 97.7 °F (36.5 °C) 61 18 114/75 100 %   06/15/24 1500 97.5 °F (36.4 °C) 56 -- 138/80 100 %   06/15/24 1400 -- 58 17 129/76 99 %   06/15/24 1330 -- 58 20 129/77 99 %   06/15/24 1300 -- 54 15 (!) 120/58 99 %   06/15/24 1230 -- 59 14 100/62 100 %   06/15/24 1142 98 °F (36.7 °C) 60 16 (!) 151/87 98 %       NEUROLOGICAL EXAM:         General:  Alert, cooperative, no acute distress  Mental Status: Oriented to time, place and person. Fully attentive. No aphasia. Full fund of knowledge. Normal recent and remote memory.      Cranial Nerves:   Visual Fields:  normal in all quadrants in both eyes. EOM: no nystagmus. Facial movements:  symmetric, no ptosis Facial sensation:  intact to LT on both sides. Hearing:  normal.       Language:  no dysarthria, no aphasia, normal fluency, normal repetition. Tongue: midline. Soft palate: not examined  SCMs: normal, symmetric.          Reflexes:   LUExt: 2+/ 4                 RUExt: 2+/ 4  LLExt: 2+/4                  RLExt: 2+/ 4          Sensory:   LT and Temp intact in all extremities            Cerebellar:  No resting, no postural tremors, normal finger nose finger.  No pronator drift                            Motor:           LUExt: 5/ 5               RUExt: 5/ 5                                              LLExt: 5/ 5                RLExt: 5/ 5        Gait:   Not tested       Portions of this note were completed with Dragon, the Simphatic voice recognition software.  Quite often unanticipated grammatical, syntax, homophones, and other interpretive errors are inadvertently transcribed by the computer software.  Please disregard these errors.  Efforts were made to edit the dictations but occasionally words are mis-transcribed.

## 2024-06-15 NOTE — ED PROVIDER NOTES
[facility] EMERGENCY DEPT  EMERGENCY DEPARTMENT ENCOUNTER      Pt Name: Jorge Hi  MRN: [id number]  Birthdate 1940  Date of evaluation: 6/15/2024  Provider: ISAEL Alegre NP      HISTORY OF PRESENT ILLNESS      This is an 84-year-old male who presents to the emergency room with complaints of acute onset dizziness that started approximately 930 last night.  This is associated with visual changes that are described as blurry vision and difficulty ambulating.  Again last known well was around 930 last night.  Patient recently started on Eliquis secondary to atrial fibrillation.  Patient is denying any chest pain but does state he has left shoulder pain that he was attributing to playing golf.  Denies any shortness of breath.  Positive nausea, no vomiting.  No diaphoresis.  No unilateral weakness.  No speech difficulties.  No history of strokes in the past.  Code S level 2 called in triage.  No further complaints.    Past Medical History:  No date: Arrhythmia      Comment:  hx afib  No date: Arthritis  No date: Cancer ([facility])      Comment:  basal & squamous cell removed   chest & arms  No date: Chronic pain      Comment:  neck & shoulders  2/15/2011: Coronary atherosclerosis of native coronary artery      Comment:  2 sets stents & CABG  2/15/2011: Essential hypertension, benign  No date: GERD (gastroesophageal reflux disease)  No date: Heart failure ([facility])  2/15/2011: Mixed hyperlipidemia  03/04/2019: Renal insufficiency      Comment:  Cr 1.57  Past Surgical History:  03/2018: HEENT      Comment:  laser surgery to correct visual problems after caratact                surgery  02/2018: HEENT      Comment:  cataract surgery bilat c no lens implnts  2007: ORTHOPEDIC SURGERY; Bilateral      Comment:  partial knee replacemets bilat  02/14/2019: PACEMAKER      Comment:  pacemaker inserted  2009: [place] ABDOMEN SURGERY PROC UNLISTED      Comment:  left inguinal hernia repair  2009: [place] CARDIAC SURG PROCEDURE

## 2024-06-15 NOTE — ED TRIAGE NOTES
11:40 AM  I have evaluated the patient as the Provider in Rapid Medical Evaluation (RME). I have reviewed his vital signs and the triage nurse assessment. I have talked with the patient and any available family and advised that I am the provider in triage and have ordered the appropriate study to initiate their work up based on the clinical presentation during my assessment. I have advised that the patient will be accommodated in the Main ED as soon as possible. I have also requested to contact the triage nurse or myself immediately if the patient experiences any changes in their condition during this brief waiting period.    Patient last known normal 9:30 PM; States positive visual changes, described as blurry, acute onset dizziness, difficulty ambulating. Code S level 2 called for concerns of posterior stroke.  ISAEL Alegre - NP

## 2024-06-15 NOTE — ED TRIAGE NOTES
LKN: 2100 6/14  Signs and Symptoms: blurriess, dizziness, and unsteady gait  Provider at bedside: 11:35  Blood pressure: 151/87  Blood glucose: 114  Blood thinners: eliquis     Code stroke level 2 called at 11:36    Pt arrives to the ER for symptoms above. Pt states that he played gold yesterday and felt fine. Pt reports that he was sitting in bed last night when the symptoms began. Pt reports that the symptoms are the same now.     Denies any changes in speech or focal weakness. Denies any numbness or tingling.

## 2024-06-15 NOTE — ED NOTES
TRANSFER - OUT REPORT:    Verbal report given to Patty (name) on Jorge Hi  being transferred to [facility] RM B313 for routine progression of patient care       Report consisted of patient’s Situation, Background, Assessment and   Recommendations(SBAR).     Information from the following report(s) Nurse Handoff Report, ED Encounter Summary, ED SBAR, MAR, and Neuro Assessment was reviewed with the receiving nurse.    Opportunity for questions and clarification was provided.      Patient transported with:   Monitor    Lines: 20g PIV RAC

## 2024-06-15 NOTE — ED NOTES
Stroke Education provided to patient and the following topics were discussed    1. Patients personal risk factors for stroke are atrial fibrillation, hyperlipidemia, hypertension, and smoking    2. Warning signs of Stroke:        * Sudden numbness or weakness of the face, arm or leg, especially on one side of          The body            * Sudden confusion, trouble speaking or understanding        * Sudden trouble seeing in one or both eyes        * Sudden trouble walking, dizziness, loss of balance or coordination        * Sudden severe headache with no known cause      3. Importance of activation Emergency Medical Services ( 9-1-1 ) immediately if experience any warning signs of stroke.    4. Be sure and schedule a follow-up appointment with your primary care doctor or any specialists as instructed.     5. You must take medicine every day to treat your risk factors for stroke.  Be sure to take your medicines exactly as your doctor tells you: no more, no less.  Know what your medicines are for , what they do.  Anti-thrombotics /anticoagulants can help prevent strokes.  You are taking the following medicine(s)  Eliquis     6.  Smoking and second-hand smoke greatly increase your risk of stroke, cardiovascular disease and death. Smoking history ended year 1964    7. Information provided was Verbal Education    8. Documentation of teaching completed in Patient Education Activity and on Care Plan with teaching response noted?  yes

## 2024-06-15 NOTE — H&P
7/6/21   Automatic Reconciliation, Ar   ondansetron (ZOFRAN-ODT) 4 MG disintegrating tablet Take 4 mg by mouth every 8 hours as needed 3/13/19   Automatic Reconciliation, Ar   pyridoxine (B-6) 100 MG tablet Take 100 mg by mouth daily    Automatic Reconciliation, Ar   senna-docusate (PERICOLACE) 8.6-50 MG per tablet Take 1 tablet by mouth 2 times daily 3/13/19   Automatic Reconciliation, Ar   zolpidem (AMBIEN) 10 MG tablet Take 10 mg by mouth.    Automatic Reconciliation, Ar       REVIEW OF SYSTEMS:  See HPI for details  General:  negative for fever, chills, sweats, weakness, weight loss  Eyes: Positive for blurring of vision  Ear Nose and Throat: negative for rhinorrhea, pharyngitis, otalgia, tinnitus, speech or swallowing difficulties  Respiratory:  negative for pleuritic pain, cough, sputum production, wheezing, SOB, VEL[place]SQUEZ  Cardiology:  negative for chest pain, palpitations, orthopnea, PND, edema, syncope   Gastrointestinal: negative for abdominal pain, N/V, dysphagia, change in bowel habits, bleeding  Genitourinary: negative for frequency, urgency, dysuria, hematuria, incontinence  Muskuloskeletal : negative for arthralgia, myalgia  Hematology: negative for easy bruising, bleeding, lymphadenopathy  Dermatological: negative for rash, ulceration, mole change, new lesion  Endocrine: negative for hot flashes or polydipsia  Neurological: Positive for dizziness and ataxia  Psychological: negative for anxiety, depression, agitation      Objective:   VITALS:    Vitals:    06/15/24 1142   BP: (!) 151/87   Pulse: 60   Resp: 16   Temp: 98 °F (36.7 °C)   SpO2: 98%     PHYSICAL EXAM:    Physical Exam:    Gen: Well-developed, well-nourished, in no acute distress  HEENT:  Pink conjunctivae, PERRL, hearing intact to voice, moist mucous membranes  Neck: Supple, without masses, thyroid non-tender  Resp: No accessory muscle use, clear breath sounds without wheezes rales or rhonchi  Card: No murmurs, normal S1, S2 without thrills,

## 2024-06-16 VITALS
TEMPERATURE: 97.5 F | HEART RATE: 74 BPM | DIASTOLIC BLOOD PRESSURE: 85 MMHG | RESPIRATION RATE: 17 BRPM | BODY MASS INDEX: 27.92 KG/M2 | SYSTOLIC BLOOD PRESSURE: 137 MMHG | OXYGEN SATURATION: 98 % | HEIGHT: 70 IN | WEIGHT: 195 LBS

## 2024-06-16 LAB
CHOLEST SERPL-MCNC: 103 MG/DL
EKG ATRIAL RATE: 64 BPM
EKG DIAGNOSIS: NORMAL
EKG Q-T INTERVAL: 490 MS
EKG QRS DURATION: 148 MS
EKG QTC CALCULATION (BAZETT): 505 MS
EKG R AXIS: 108 DEGREES
EKG T AXIS: 20 DEGREES
EKG VENTRICULAR RATE: 64 BPM
ERYTHROCYTE [DISTWIDTH] IN BLOOD BY AUTOMATED COUNT: 15.1 % (ref 11.5–14.5)
EST. AVERAGE GLUCOSE BLD GHB EST-MCNC: 108 MG/DL
HBA1C MFR BLD: 5.4 % (ref 4–5.6)
HCT VFR BLD AUTO: 36.1 % (ref 36.6–50.3)
HDLC SERPL-MCNC: 45 MG/DL
HDLC SERPL: 2.3 (ref 0–5)
HGB BLD-MCNC: 11.4 G/DL (ref 12.1–17)
LDLC SERPL CALC-MCNC: 41.6 MG/DL (ref 0–100)
MCH RBC QN AUTO: 31 PG (ref 26–34)
MCHC RBC AUTO-ENTMCNC: 31.6 G/DL (ref 30–36.5)
MCV RBC AUTO: 98.1 FL (ref 80–99)
NRBC # BLD: 0 K/UL (ref 0–0.01)
NRBC BLD-RTO: 0 PER 100 WBC
PLATELET # BLD AUTO: 163 K/UL (ref 150–400)
PMV BLD AUTO: 10.7 FL (ref 8.9–12.9)
RBC # BLD AUTO: 3.68 M/UL (ref 4.1–5.7)
TRIGL SERPL-MCNC: 82 MG/DL
VLDLC SERPL CALC-MCNC: 16.4 MG/DL
WBC # BLD AUTO: 6.9 K/UL (ref 4.1–11.1)

## 2024-06-16 PROCEDURE — 80061 LIPID PANEL: CPT

## 2024-06-16 PROCEDURE — 2580000003 HC RX 258: Performed by: HOSPITALIST

## 2024-06-16 PROCEDURE — 97161 PT EVAL LOW COMPLEX 20 MIN: CPT

## 2024-06-16 PROCEDURE — 94761 N-INVAS EAR/PLS OXIMETRY MLT: CPT

## 2024-06-16 PROCEDURE — 36415 COLL VENOUS BLD VENIPUNCTURE: CPT

## 2024-06-16 PROCEDURE — 99223 1ST HOSP IP/OBS HIGH 75: CPT | Performed by: NURSE PRACTITIONER

## 2024-06-16 PROCEDURE — 6370000000 HC RX 637 (ALT 250 FOR IP): Performed by: HOSPITALIST

## 2024-06-16 PROCEDURE — 85027 COMPLETE CBC AUTOMATED: CPT

## 2024-06-16 PROCEDURE — 97165 OT EVAL LOW COMPLEX 30 MIN: CPT

## 2024-06-16 PROCEDURE — 97116 GAIT TRAINING THERAPY: CPT

## 2024-06-16 PROCEDURE — 83036 HEMOGLOBIN GLYCOSYLATED A1C: CPT

## 2024-06-16 RX ORDER — ASPIRIN 81 MG/1
81 TABLET ORAL DAILY
Qty: 30 TABLET | Refills: 3 | Status: SHIPPED | OUTPATIENT
Start: 2024-06-17

## 2024-06-16 RX ADMIN — APIXABAN 2.5 MG: 2.5 TABLET, FILM COATED ORAL at 08:03

## 2024-06-16 RX ADMIN — SODIUM CHLORIDE, PRESERVATIVE FREE 10 ML: 5 INJECTION INTRAVENOUS at 08:03

## 2024-06-16 RX ADMIN — ASPIRIN 81 MG: 81 TABLET, COATED ORAL at 08:03

## 2024-06-16 ASSESSMENT — PAIN SCALES - GENERAL
PAINLEVEL_OUTOF10: 0
PAINLEVEL_OUTOF10: 0

## 2024-06-16 ASSESSMENT — PAIN SCALES - WONG BAKER
WONGBAKER_NUMERICALRESPONSE: NO HURT
WONGBAKER_NUMERICALRESPONSE: NO HURT

## 2024-06-16 NOTE — DISCHARGE INSTRUCTIONS
Patient or Representative Signature                                                          Date/Time

## 2024-06-16 NOTE — PROGRESS NOTES
[facility]  [address] [place] [address] [phone]      Hospitalist Progress Note      NAME: Jorge Hi   :  1940  MRM:  [id number]    Date of service: 2024  9:45 AM       Assessment and Plan:    Dizziness. This morning pt stated that the dizziness is better. CT head is negative. CTA head and neck showed left ICA moderate to severe stenosis. CT perfusion is negative. Check MRI of the brain( pt has PPM). Consult neurology, PT/OT. Continue aspirin, statins.( Pt has Hx of chronic dizziness on and off. He was seen by ENT also)).     2.  Chronic A-fib. S/p PPM 4 years ago. Rate is well controlled. On eliquis.      3.  CAD. Cont statin and eliquis.     4.  Hyperlipidemia. Continue statins.     5.  Hypertension. Continue valsartan, Bumex    6.  CKD stage 4. Avoid nephro toxic drugs. Monitor             Subjective:     Chief Complaint:: Patient was seen and examined as a follow up for dizziness.  Chart was reviewed. felt better this morning     ROS:  (bold if positive, if negative)    Tolerating PT  Tolerating Diet        Objective:     Last 24hrs VS reviewed since prior progress note. Most recent are:    Vitals:    24 0851   BP: 137/85   Pulse: 74   Resp: 17   Temp: 97.5 °F (36.4 °C)   SpO2: 98%     SpO2 Readings from Last 6 Encounters:   24 98%   23 97%          Intake/Output Summary (Last 24 hours) at 2024 0945  Last data filed at 2024 0656  Gross per 24 hour   Intake 200 ml   Output 1200 ml   Net -1000 ml        Physical Exam:    Gen:  Well-developed, well-nourished, in no acute distress  HEENT:  Pink conjunctivae, PERRL, hearing intact to voice, moist mucous membranes  Neck:  Supple, without masses, thyroid non-tender  Resp:  No accessory muscle use, clear breath sounds without wheezes rales or rhonchi  Card:  No murmurs, normal S1, S2 without thrills, bruits or peripheral edema  Abd:  Soft, non-tender, non-distended,

## 2024-06-16 NOTE — PLAN OF CARE
Problem: Pain  Goal: Verbalizes/displays adequate comfort level or baseline comfort level  Outcome: Progressing     
Assessment  Prior Vision: Wears glasses only for reading  Visual History: Cataracts  Visual Field Cut: No  Oculo Motor Control: WNL  Vision Adaptations: Cataract surgery     Vision  Vision: Within Functional Limits  Perception  Overall Perceptual Status: WFL    Strength:    Strength: Within functional limits    Tone & Sensation:   Tone: Normal  Sensation: Intact    Coordination:  Coordination: Generally decreased, functional    Range Of Motion:  AROM: Within functional limits       Functional Mobility:  Bed Mobility:     Bed Mobility Training  Bed Mobility Training: No (pt received OOB in chair)  Transfers:     Transfer Training  Transfer Training: Yes  Sit to Stand: Independent  Stand to Sit: Independent  Bed to Chair: Independent  Balance:               Balance  Sitting: Intact  Standing: Without support  Standing - Static: Good  Standing - Dynamic: Good;Fair  Ambulation/Gait Training:                       Gait  Gait Training: Yes  Overall Level of Assistance: Independent  Distance (ft): 200 Feet  Assistive Device: Gait belt  Base of Support: Narrowed  Gait Abnormalities: Path deviations                                                                                                                                                                                                                                                            Shaver Balance Test:    Shaver Balance Scale  1. Sitting to Standing: Able to stand without using hands and stabilize independently  2. Standing Unsupported: Able to stand safely for 2 minutes  3. Sitting with Back Unsupported but Feet Supported on Floor or on a Stool: Able to sit safely and securely for 2 minutes  4. Standing to Sitting: Sits safely with minimal use of hands  5.  Transfers: Able to transfer safely with minor use of hands  6. Standing Unsupported with Eyes Closed: Able to stand 10 seconds safely  7. Standing Unsupported with Feet Together: Able to place feet together

## 2024-06-16 NOTE — DISCHARGE SUMMARY
Hospitalist Discharge Summary     Patient ID:    Jorge Hi  [id number]  84 y.o.  1940    Admit date of service: 6/15/2024    Discharge date of service: 6/16/2024    Admission Diagnoses: Dizziness [R42]  Acute cerebrovascular accident (CVA) ([facility]) [I63.9]    Chronic Diagnoses:      Discharge Medications:   Current Discharge Medication List        CONTINUE these medications which have CHANGED    Details   aspirin 81 MG EC tablet Take 1 tablet by mouth daily  Qty: 30 tablet, Refills: 3           CONTINUE these medications which have NOT CHANGED    Details   apixaban (ELIQUIS) 2.5 MG TABS tablet Take 1 tablet by mouth 2 times daily      bumetanide (BUMEX) 0.5 MG tablet Take 1 tablet by mouth daily      acetaminophen (TYLENOL) 325 MG tablet Take 2 tablets by mouth every 6 hours as needed for Pain      fluticasone (FLONASE) 50 MCG/ACT nasal spray 2 sprays by Nasal route daily      valsartan (DIOVAN) 40 MG tablet Take 1 tablet by mouth daily      atorvastatin (LIPITOR) 40 MG tablet Take 1 tablet by mouth      lidocaine (LIDODERM) 5 % Apply patch to the affected area for 12 hours a day and remove for 12 hours a day.      ondansetron (ZOFRAN-ODT) 4 MG disintegrating tablet Take 1 tablet by mouth every 8 hours as needed      pyridoxine (B-6) 100 MG tablet Take 1 tablet by mouth daily      senna-docusate (PERICOLACE) 8.6-50 MG per tablet Take 1 tablet by mouth 2 times daily      zolpidem (AMBIEN) 10 MG tablet Take 1 tablet by mouth.           STOP taking these medications       carvedilol (COREG) 3.125 MG tablet Comments:   Reason for Stopping:               Follow up Care:    1. Jose Pritchett MD  [address]  [facility] [address] [phone]    Schedule an appointment as soon as possible for a visit  hospital follow up-take aspirin 81 mg daily until seen by Dr. Pritchett   in 1-2 weeks  2. Neurology     Diet:  cardiac diet    Disposition:  Home.    Advanced Directive:    Discharge

## 2024-06-16 NOTE — THERAPY EVALUATION
Admission to Discharge   10 point increase in FMA Upper Extremity = 1.5 change in discharge FIM   10 point increase in FMA Lower Extremity = 1.9 change in discharge FIM  MDC:   Stroke:   (Dagoberto et al, 2008, n = 14, mean age = 59.9 (14.6) years, assessed on average 14 (6.5) months post stroke, Chronic Stroke)   FMA = 5.2 points for the Upper Extremity portion of the assessment     Pain Rating:  Pt reporting chronic neck pain    Pain Intervention(s):   pain is at a level acceptable to the patient    Activity Tolerance:   Good, tolerates ADLS without rest breaks, and SpO2 stable on room air    After treatment:   Pt left up with Physical Therapist    COMMUNICATION/EDUCATION:   The patient's plan of care was discussed with: physical therapist, registered nurse, and physician    Thank you for this referral.  Charline Durán OT  Minutes: 15

## 2024-06-20 NOTE — PROGRESS NOTES
Physician Progress Note      PATIENT:               GLEN OLIVO  [facility] #:                  [id number]  :                       1940  ADMIT DATE:       6/15/2024 11:44 AM  DISCH DATE:        2024 11:50 AM  RESPONDING  PROVIDER #:        Prasanth Maria MD          QUERY TEXT:    Good morning.    Pt admitted with dizziness. Pt noted to have moderate to severe left internal   carotid artery stenosis on 06/15 Head/Neck CTA.    If possible, please document in progress notes and discharge summary the   relationship, if any, between dizziness and left internal artery stenosis.    The medical record reflects the following:    Risk Factors: 84 yr old male, Chronic Atrial Fibrillation, CAD    Clinical Indicators: 06/15 Head CT: No acute abnormality; 06/15 Head/Neck CTA:   Moderate to severe left internal carotid artery stenosis. No large vessel   occlusion or manage perfusion defect; from  DC summary: \"1. Dizziness.   This morning pt stated that the dizziness is better. CT head is negative. CTA   head and neck showed left ICA moderate to severe stenosis. CT perfusion is   negative. Check MRI of the brain( pt has PPM). Evaluated by neurology.   Recommended to FU with his neurologist and possible MRI of the head to be done   as outpt(Pt has a pacemaker and MRI couldn't be done until Tuesday), PT/OT   eval for Epley maneuver. Continue aspirin, statins.( Pt has Hx of chronic   dizziness on and off. He was seen by ENT also).\";  Neurology consult:   \"Blurred vision and unsteady gait, r/o Stroke: Continue home Eliquis, add   aspirin 81 mg which patient is to take until seen and evaluated by home   neurologist Neurochecks:  Every 4 hours ? Long discussion with patient and his   son, patient has a pacemaker and is not anxious to stay for MRI which could   not be done until likely Tuesday.  We agreed that he would make an appointment   with Dr. Pritchett for follow-up and to schedule an outpatient MRI

## 2025-02-17 ENCOUNTER — TELEPHONE (OUTPATIENT)
Age: 85
End: 2025-02-17

## 2025-02-17 NOTE — TELEPHONE ENCOUNTER
Left  for pt to schedule appt with Dr Eagle. The referral came from Dr Chad Kline for dizziness & giddiness. Please schedule pt when he calls.

## 2025-03-12 ENCOUNTER — TRANSCRIBE ORDERS (OUTPATIENT)
Facility: HOSPITAL | Age: 85
End: 2025-03-12

## 2025-03-12 DIAGNOSIS — R06.02 SHORTNESS OF BREATH: ICD-10-CM

## 2025-03-12 DIAGNOSIS — R42 DIZZINESS: Primary | ICD-10-CM

## 2025-03-12 DIAGNOSIS — T17.908A ASPIRATION INTO RESPIRATORY TRACT, INITIAL ENCOUNTER: ICD-10-CM

## 2025-03-12 DIAGNOSIS — R06.2 WHEEZING: ICD-10-CM

## 2025-03-12 DIAGNOSIS — R13.10 DYSPHAGIA, UNSPECIFIED TYPE: ICD-10-CM

## 2025-03-17 ENCOUNTER — APPOINTMENT (OUTPATIENT)
Facility: HOSPITAL | Age: 85
DRG: 442 | End: 2025-03-17
Payer: MEDICARE

## 2025-03-17 ENCOUNTER — HOSPITAL ENCOUNTER (EMERGENCY)
Facility: HOSPITAL | Age: 85
Discharge: LEFT BEFORE DISCHARGE | DRG: 442 | End: 2025-03-17
Attending: EMERGENCY MEDICINE | Admitting: INTERNAL MEDICINE
Payer: MEDICARE

## 2025-03-17 VITALS
BODY MASS INDEX: 27.2 KG/M2 | OXYGEN SATURATION: 99 % | SYSTOLIC BLOOD PRESSURE: 122 MMHG | TEMPERATURE: 97.9 F | RESPIRATION RATE: 14 BRPM | DIASTOLIC BLOOD PRESSURE: 91 MMHG | WEIGHT: 190 LBS | HEART RATE: 87 BPM | HEIGHT: 70 IN

## 2025-03-17 DIAGNOSIS — N18.32 ACUTE RENAL FAILURE SUPERIMPOSED ON STAGE 3B CHRONIC KIDNEY DISEASE, UNSPECIFIED ACUTE RENAL FAILURE TYPE (HCC): ICD-10-CM

## 2025-03-17 DIAGNOSIS — R06.00 DYSPNEA AND RESPIRATORY ABNORMALITIES: ICD-10-CM

## 2025-03-17 DIAGNOSIS — K75.9 HEPATITIS: Primary | ICD-10-CM

## 2025-03-17 DIAGNOSIS — G47.33 OBSTRUCTIVE SLEEP APNEA SYNDROME: ICD-10-CM

## 2025-03-17 DIAGNOSIS — Z53.29 LEFT AGAINST MEDICAL ADVICE: ICD-10-CM

## 2025-03-17 DIAGNOSIS — N17.9 ACUTE RENAL FAILURE SUPERIMPOSED ON STAGE 3B CHRONIC KIDNEY DISEASE, UNSPECIFIED ACUTE RENAL FAILURE TYPE (HCC): ICD-10-CM

## 2025-03-17 DIAGNOSIS — R06.89 DYSPNEA AND RESPIRATORY ABNORMALITIES: ICD-10-CM

## 2025-03-17 LAB
ALBUMIN SERPL-MCNC: 3.3 G/DL (ref 3.5–5)
ALBUMIN/GLOB SERPL: 1.1 (ref 1.1–2.2)
ALP SERPL-CCNC: 176 U/L (ref 45–117)
ALT SERPL-CCNC: 134 U/L (ref 12–78)
ANION GAP SERPL CALC-SCNC: 8 MMOL/L (ref 2–12)
APAP SERPL-MCNC: 8 UG/ML (ref 10–30)
AST SERPL-CCNC: 62 U/L (ref 15–37)
BASOPHILS # BLD: 0.05 K/UL (ref 0–0.1)
BASOPHILS NFR BLD: 0.6 % (ref 0–1)
BILIRUB DIRECT SERPL-MCNC: 1.5 MG/DL (ref 0–0.2)
BILIRUB SERPL-MCNC: 2.7 MG/DL (ref 0.2–1)
BUN SERPL-MCNC: 52 MG/DL (ref 6–20)
BUN/CREAT SERPL: 24 (ref 12–20)
CALCIUM SERPL-MCNC: 8.7 MG/DL (ref 8.5–10.1)
CHLORIDE SERPL-SCNC: 114 MMOL/L (ref 97–108)
CO2 SERPL-SCNC: 20 MMOL/L (ref 21–32)
COMMENT:: NORMAL
CREAT SERPL-MCNC: 2.19 MG/DL (ref 0.7–1.3)
DIFFERENTIAL METHOD BLD: ABNORMAL
EOSINOPHIL # BLD: 0.23 K/UL (ref 0–0.4)
EOSINOPHIL NFR BLD: 2.8 % (ref 0–7)
ERYTHROCYTE [DISTWIDTH] IN BLOOD BY AUTOMATED COUNT: 19.9 % (ref 11.5–14.5)
GLOBULIN SER CALC-MCNC: 2.9 G/DL (ref 2–4)
GLUCOSE SERPL-MCNC: 115 MG/DL (ref 65–100)
HCT VFR BLD AUTO: 35.9 % (ref 36.6–50.3)
HGB BLD-MCNC: 10.8 G/DL (ref 12.1–17)
IMM GRANULOCYTES # BLD AUTO: 0.03 K/UL (ref 0–0.04)
IMM GRANULOCYTES NFR BLD AUTO: 0.4 % (ref 0–0.5)
INR PPP: 1.1 (ref 0.9–1.1)
LYMPHOCYTES # BLD: 1.02 K/UL (ref 0.8–3.5)
LYMPHOCYTES NFR BLD: 12.6 % (ref 12–49)
MCH RBC QN AUTO: 27.2 PG (ref 26–34)
MCHC RBC AUTO-ENTMCNC: 30.1 G/DL (ref 30–36.5)
MCV RBC AUTO: 90.4 FL (ref 80–99)
MONOCYTES # BLD: 0.57 K/UL (ref 0–1)
MONOCYTES NFR BLD: 7.1 % (ref 5–13)
NEUTS SEG # BLD: 6.18 K/UL (ref 1.8–8)
NEUTS SEG NFR BLD: 76.5 % (ref 32–75)
NRBC # BLD: 0.03 K/UL (ref 0–0.01)
NRBC BLD-RTO: 0.4 PER 100 WBC
PLATELET # BLD AUTO: 198 K/UL (ref 150–400)
PMV BLD AUTO: 11.1 FL (ref 8.9–12.9)
POTASSIUM SERPL-SCNC: 3.9 MMOL/L (ref 3.5–5.1)
PROT SERPL-MCNC: 6.2 G/DL (ref 6.4–8.2)
PROTHROMBIN TIME: 11.5 SEC (ref 9.2–11.2)
RBC # BLD AUTO: 3.97 M/UL (ref 4.1–5.7)
SODIUM SERPL-SCNC: 142 MMOL/L (ref 136–145)
SPECIMEN HOLD: NORMAL
WBC # BLD AUTO: 8.1 K/UL (ref 4.1–11.1)

## 2025-03-17 PROCEDURE — 2580000003 HC RX 258: Performed by: EMERGENCY MEDICINE

## 2025-03-17 PROCEDURE — 36415 COLL VENOUS BLD VENIPUNCTURE: CPT

## 2025-03-17 PROCEDURE — 96360 HYDRATION IV INFUSION INIT: CPT

## 2025-03-17 PROCEDURE — 99284 EMERGENCY DEPT VISIT MOD MDM: CPT

## 2025-03-17 PROCEDURE — 80053 COMPREHEN METABOLIC PANEL: CPT

## 2025-03-17 PROCEDURE — 76700 US EXAM ABDOM COMPLETE: CPT

## 2025-03-17 PROCEDURE — 80143 DRUG ASSAY ACETAMINOPHEN: CPT

## 2025-03-17 PROCEDURE — 1100000000 HC RM PRIVATE

## 2025-03-17 PROCEDURE — 85025 COMPLETE CBC W/AUTO DIFF WBC: CPT

## 2025-03-17 PROCEDURE — 82248 BILIRUBIN DIRECT: CPT

## 2025-03-17 PROCEDURE — 85610 PROTHROMBIN TIME: CPT

## 2025-03-17 RX ORDER — ACETAMINOPHEN 650 MG/1
650 SUPPOSITORY RECTAL EVERY 6 HOURS PRN
Status: DISCONTINUED | OUTPATIENT
Start: 2025-03-17 | End: 2025-03-17 | Stop reason: HOSPADM

## 2025-03-17 RX ORDER — SODIUM CHLORIDE 0.9 % (FLUSH) 0.9 %
5-40 SYRINGE (ML) INJECTION PRN
Status: DISCONTINUED | OUTPATIENT
Start: 2025-03-17 | End: 2025-03-17 | Stop reason: HOSPADM

## 2025-03-17 RX ORDER — SODIUM CHLORIDE, SODIUM LACTATE, POTASSIUM CHLORIDE, AND CALCIUM CHLORIDE .6; .31; .03; .02 G/100ML; G/100ML; G/100ML; G/100ML
1000 INJECTION, SOLUTION INTRAVENOUS ONCE
Status: COMPLETED | OUTPATIENT
Start: 2025-03-17 | End: 2025-03-17

## 2025-03-17 RX ORDER — ENOXAPARIN SODIUM 100 MG/ML
30 INJECTION SUBCUTANEOUS
Status: DISCONTINUED | OUTPATIENT
Start: 2025-03-17 | End: 2025-03-17 | Stop reason: HOSPADM

## 2025-03-17 RX ORDER — POLYETHYLENE GLYCOL 3350 17 G/17G
17 POWDER, FOR SOLUTION ORAL DAILY PRN
Status: DISCONTINUED | OUTPATIENT
Start: 2025-03-17 | End: 2025-03-17 | Stop reason: HOSPADM

## 2025-03-17 RX ORDER — ONDANSETRON 4 MG/1
4 TABLET, ORALLY DISINTEGRATING ORAL EVERY 8 HOURS PRN
Status: DISCONTINUED | OUTPATIENT
Start: 2025-03-17 | End: 2025-03-17 | Stop reason: HOSPADM

## 2025-03-17 RX ORDER — SODIUM CHLORIDE 9 MG/ML
INJECTION, SOLUTION INTRAVENOUS PRN
Status: DISCONTINUED | OUTPATIENT
Start: 2025-03-17 | End: 2025-03-17 | Stop reason: HOSPADM

## 2025-03-17 RX ORDER — ONDANSETRON 2 MG/ML
4 INJECTION INTRAMUSCULAR; INTRAVENOUS EVERY 6 HOURS PRN
Status: DISCONTINUED | OUTPATIENT
Start: 2025-03-17 | End: 2025-03-17 | Stop reason: HOSPADM

## 2025-03-17 RX ORDER — SODIUM CHLORIDE 0.9 % (FLUSH) 0.9 %
5-40 SYRINGE (ML) INJECTION EVERY 12 HOURS SCHEDULED
Status: DISCONTINUED | OUTPATIENT
Start: 2025-03-17 | End: 2025-03-17 | Stop reason: HOSPADM

## 2025-03-17 RX ORDER — ACETAMINOPHEN 325 MG/1
650 TABLET ORAL EVERY 6 HOURS PRN
Status: DISCONTINUED | OUTPATIENT
Start: 2025-03-17 | End: 2025-03-17 | Stop reason: HOSPADM

## 2025-03-17 RX ADMIN — SODIUM CHLORIDE, SODIUM LACTATE, POTASSIUM CHLORIDE, AND CALCIUM CHLORIDE 1000 ML: .6; .31; .03; .02 INJECTION, SOLUTION INTRAVENOUS at 19:11

## 2025-03-17 ASSESSMENT — PAIN - FUNCTIONAL ASSESSMENT: PAIN_FUNCTIONAL_ASSESSMENT: NONE - DENIES PAIN

## 2025-03-17 NOTE — ED PROVIDER NOTES
Den previously with baseline about 1.5. Noted with elevated LFTs today. Pt has MATHEW, recently diagnosed. US of abdomen shows no acute changes. Pt with worsening function over past 4 weeks.      PATIENT REFERRED TO:  No follow-up provider specified.    DISCHARGE MEDICATIONS:  New Prescriptions    No medications on file         (Please note that portions of this note were completed with a transcription program.  Efforts were made to edit the dictations but occasionally words are mis-transcribed.)    Quinton Penny MD (electronically signed)  Emergency Attending Physician            Quinton Penny MD  03/17/25 [id number]

## 2025-03-17 NOTE — ED TRIAGE NOTES
Patient arrived ambulatory with son via POV with cc abnormal kidney and liver labs per PCP. Patient reports fatigue x few months. Denies chest pain, abdominal pain, shortness of breath, n/v/d, urinary problems.

## 2025-03-21 ENCOUNTER — APPOINTMENT (OUTPATIENT)
Facility: HOSPITAL | Age: 85
DRG: 071 | End: 2025-03-21
Attending: EMERGENCY MEDICINE
Payer: MEDICARE

## 2025-03-21 ENCOUNTER — HOSPITAL ENCOUNTER (INPATIENT)
Facility: HOSPITAL | Age: 85
LOS: 4 days | Discharge: HOME | DRG: 071 | End: 2025-03-25
Attending: EMERGENCY MEDICINE | Admitting: HOSPITALIST
Payer: MEDICARE

## 2025-03-21 ENCOUNTER — APPOINTMENT (OUTPATIENT)
Facility: HOSPITAL | Age: 85
DRG: 071 | End: 2025-03-21
Payer: MEDICARE

## 2025-03-21 DIAGNOSIS — G93.40 ENCEPHALOPATHY, UNSPECIFIED TYPE: Primary | ICD-10-CM

## 2025-03-21 DIAGNOSIS — R79.89 INCREASED AMMONIA LEVEL: ICD-10-CM

## 2025-03-21 DIAGNOSIS — R55 PRE-SYNCOPE: ICD-10-CM

## 2025-03-21 DIAGNOSIS — N18.9 CHRONIC KIDNEY DISEASE, UNSPECIFIED CKD STAGE: ICD-10-CM

## 2025-03-21 DIAGNOSIS — R17 SERUM TOTAL BILIRUBIN ELEVATED: ICD-10-CM

## 2025-03-21 LAB
ALBUMIN SERPL-MCNC: 3 G/DL (ref 3.5–5)
ALBUMIN/GLOB SERPL: 0.9 (ref 1.1–2.2)
ALP SERPL-CCNC: 170 U/L (ref 45–117)
ALT SERPL-CCNC: 69 U/L (ref 12–78)
AMMONIA PLAS-SCNC: 42 UMOL/L
AMPHET UR QL SCN: NEGATIVE
ANION GAP SERPL CALC-SCNC: 8 MMOL/L (ref 2–12)
APPEARANCE UR: CLEAR
AST SERPL-CCNC: 33 U/L (ref 15–37)
BACTERIA URNS QL MICRO: NEGATIVE /HPF
BARBITURATES UR QL SCN: NEGATIVE
BASOPHILS # BLD: 0.05 K/UL (ref 0–0.1)
BASOPHILS NFR BLD: 0.6 % (ref 0–1)
BENZODIAZ UR QL: NEGATIVE
BILIRUB SERPL-MCNC: 2.6 MG/DL (ref 0.2–1)
BILIRUB UR QL CFM: POSITIVE
BUN SERPL-MCNC: 41 MG/DL (ref 6–20)
BUN/CREAT SERPL: 21 (ref 12–20)
CALCIUM SERPL-MCNC: 8.9 MG/DL (ref 8.5–10.1)
CANNABINOIDS UR QL SCN: NEGATIVE
CHLORIDE SERPL-SCNC: 111 MMOL/L (ref 97–108)
CO2 SERPL-SCNC: 22 MMOL/L (ref 21–32)
COCAINE UR QL SCN: NEGATIVE
COLOR UR: ABNORMAL
COMMENT:: NORMAL
COMMENT:: NORMAL
CREAT SERPL-MCNC: 1.99 MG/DL (ref 0.7–1.3)
DIFFERENTIAL METHOD BLD: ABNORMAL
EOSINOPHIL # BLD: 0.27 K/UL (ref 0–0.4)
EOSINOPHIL NFR BLD: 3.5 % (ref 0–7)
EPITH CASTS URNS QL MICRO: ABNORMAL /LPF
ERYTHROCYTE [DISTWIDTH] IN BLOOD BY AUTOMATED COUNT: 20.7 % (ref 11.5–14.5)
ETHANOL SERPL-MCNC: 12 MG/DL (ref 0–0.08)
FLUAV RNA SPEC QL NAA+PROBE: NOT DETECTED
FLUBV RNA SPEC QL NAA+PROBE: NOT DETECTED
GLOBULIN SER CALC-MCNC: 3.2 G/DL (ref 2–4)
GLUCOSE BLD STRIP.AUTO-MCNC: 112 MG/DL (ref 65–117)
GLUCOSE SERPL-MCNC: 88 MG/DL (ref 65–100)
GLUCOSE UR STRIP.AUTO-MCNC: NEGATIVE MG/DL
HCT VFR BLD AUTO: 39.3 % (ref 36.6–50.3)
HGB BLD-MCNC: 11.7 G/DL (ref 12.1–17)
HGB UR QL STRIP: ABNORMAL
IMM GRANULOCYTES # BLD AUTO: 0.02 K/UL (ref 0–0.04)
IMM GRANULOCYTES NFR BLD AUTO: 0.3 % (ref 0–0.5)
KETONES UR QL STRIP.AUTO: NEGATIVE MG/DL
LACTATE BLD-SCNC: 2.11 MMOL/L (ref 0.4–2)
LACTATE SERPL-SCNC: 1.7 MMOL/L (ref 0.4–2)
LEUKOCYTE ESTERASE UR QL STRIP.AUTO: ABNORMAL
LYMPHOCYTES # BLD: 1.01 K/UL (ref 0.8–3.5)
LYMPHOCYTES NFR BLD: 13 % (ref 12–49)
Lab: ABNORMAL
MCH RBC QN AUTO: 26.8 PG (ref 26–34)
MCHC RBC AUTO-ENTMCNC: 29.8 G/DL (ref 30–36.5)
MCV RBC AUTO: 90.1 FL (ref 80–99)
METHADONE UR QL: NEGATIVE
MONOCYTES # BLD: 0.73 K/UL (ref 0–1)
MONOCYTES NFR BLD: 9.3 % (ref 5–13)
NEUTS SEG # BLD: 5.72 K/UL (ref 1.8–8)
NEUTS SEG NFR BLD: 73.3 % (ref 32–75)
NITRITE UR QL STRIP.AUTO: NEGATIVE
NRBC # BLD: 0 K/UL (ref 0–0.01)
NRBC BLD-RTO: 0 PER 100 WBC
NT PRO BNP: ABNORMAL PG/ML
OPIATES UR QL: POSITIVE
PCP UR QL: NEGATIVE
PH UR STRIP: 5 (ref 5–8)
PLATELET # BLD AUTO: 289 K/UL (ref 150–400)
PMV BLD AUTO: 11.3 FL (ref 8.9–12.9)
POTASSIUM SERPL-SCNC: 3.8 MMOL/L (ref 3.5–5.1)
PROT SERPL-MCNC: 6.2 G/DL (ref 6.4–8.2)
PROT UR STRIP-MCNC: 30 MG/DL
RBC # BLD AUTO: 4.36 M/UL (ref 4.1–5.7)
RBC #/AREA URNS HPF: >100 /HPF (ref 0–5)
RBC MORPH BLD: ABNORMAL
SARS-COV-2 RNA RESP QL NAA+PROBE: NOT DETECTED
SERVICE CMNT-IMP: NORMAL
SODIUM SERPL-SCNC: 141 MMOL/L (ref 136–145)
SOURCE: NORMAL
SP GR UR REFRACTOMETRY: 1.02 (ref 1–1.03)
SPECIMEN HOLD: NORMAL
SPECIMEN HOLD: NORMAL
TROPONIN I SERPL HS-MCNC: 75 NG/L (ref 0–76)
UROBILINOGEN UR QL STRIP.AUTO: 2 EU/DL (ref 0.2–1)
WBC # BLD AUTO: 7.8 K/UL (ref 4.1–11.1)
WBC URNS QL MICRO: ABNORMAL /HPF (ref 0–4)

## 2025-03-21 PROCEDURE — 99285 EMERGENCY DEPT VISIT HI MDM: CPT

## 2025-03-21 PROCEDURE — 87040 BLOOD CULTURE FOR BACTERIA: CPT

## 2025-03-21 PROCEDURE — 80307 DRUG TEST PRSMV CHEM ANLYZR: CPT

## 2025-03-21 PROCEDURE — 93005 ELECTROCARDIOGRAM TRACING: CPT | Performed by: EMERGENCY MEDICINE

## 2025-03-21 PROCEDURE — 80053 COMPREHEN METABOLIC PANEL: CPT

## 2025-03-21 PROCEDURE — 6370000000 HC RX 637 (ALT 250 FOR IP): Performed by: HOSPITALIST

## 2025-03-21 PROCEDURE — 85025 COMPLETE CBC W/AUTO DIFF WBC: CPT

## 2025-03-21 PROCEDURE — 84484 ASSAY OF TROPONIN QUANT: CPT

## 2025-03-21 PROCEDURE — 81001 URINALYSIS AUTO W/SCOPE: CPT

## 2025-03-21 PROCEDURE — 83605 ASSAY OF LACTIC ACID: CPT

## 2025-03-21 PROCEDURE — 70450 CT HEAD/BRAIN W/O DYE: CPT

## 2025-03-21 PROCEDURE — 82140 ASSAY OF AMMONIA: CPT

## 2025-03-21 PROCEDURE — 82962 GLUCOSE BLOOD TEST: CPT

## 2025-03-21 PROCEDURE — 82077 ASSAY SPEC XCP UR&BREATH IA: CPT

## 2025-03-21 PROCEDURE — 83880 ASSAY OF NATRIURETIC PEPTIDE: CPT

## 2025-03-21 PROCEDURE — 71045 X-RAY EXAM CHEST 1 VIEW: CPT

## 2025-03-21 PROCEDURE — 2500000003 HC RX 250 WO HCPCS: Performed by: HOSPITALIST

## 2025-03-21 PROCEDURE — 87636 SARSCOV2 & INF A&B AMP PRB: CPT

## 2025-03-21 PROCEDURE — 6360000002 HC RX W HCPCS: Performed by: HOSPITALIST

## 2025-03-21 PROCEDURE — 94761 N-INVAS EAR/PLS OXIMETRY MLT: CPT

## 2025-03-21 PROCEDURE — 1100000000 HC RM PRIVATE

## 2025-03-21 PROCEDURE — 36415 COLL VENOUS BLD VENIPUNCTURE: CPT

## 2025-03-21 RX ORDER — SODIUM CHLORIDE 0.9 % (FLUSH) 0.9 %
5-40 SYRINGE (ML) INJECTION EVERY 12 HOURS SCHEDULED
Status: DISCONTINUED | OUTPATIENT
Start: 2025-03-21 | End: 2025-03-25 | Stop reason: HOSPADM

## 2025-03-21 RX ORDER — ONDANSETRON 4 MG/1
4 TABLET, ORALLY DISINTEGRATING ORAL EVERY 8 HOURS PRN
Status: DISCONTINUED | OUTPATIENT
Start: 2025-03-21 | End: 2025-03-25 | Stop reason: HOSPADM

## 2025-03-21 RX ORDER — ONDANSETRON 2 MG/ML
4 INJECTION INTRAMUSCULAR; INTRAVENOUS EVERY 6 HOURS PRN
Status: DISCONTINUED | OUTPATIENT
Start: 2025-03-21 | End: 2025-03-25 | Stop reason: HOSPADM

## 2025-03-21 RX ORDER — ACETAMINOPHEN 325 MG/1
650 TABLET ORAL EVERY 6 HOURS PRN
Status: DISCONTINUED | OUTPATIENT
Start: 2025-03-21 | End: 2025-03-25 | Stop reason: HOSPADM

## 2025-03-21 RX ORDER — SODIUM CHLORIDE 0.9 % (FLUSH) 0.9 %
5-40 SYRINGE (ML) INJECTION PRN
Status: DISCONTINUED | OUTPATIENT
Start: 2025-03-21 | End: 2025-03-25 | Stop reason: HOSPADM

## 2025-03-21 RX ORDER — ATORVASTATIN CALCIUM 20 MG/1
40 TABLET, FILM COATED ORAL NIGHTLY
Status: DISCONTINUED | OUTPATIENT
Start: 2025-03-21 | End: 2025-03-25 | Stop reason: HOSPADM

## 2025-03-21 RX ORDER — ACETAMINOPHEN 650 MG/1
650 SUPPOSITORY RECTAL EVERY 6 HOURS PRN
Status: DISCONTINUED | OUTPATIENT
Start: 2025-03-21 | End: 2025-03-25 | Stop reason: HOSPADM

## 2025-03-21 RX ORDER — FLUTICASONE PROPIONATE 50 MCG
2 SPRAY, SUSPENSION (ML) NASAL DAILY
Status: DISCONTINUED | OUTPATIENT
Start: 2025-03-21 | End: 2025-03-25 | Stop reason: HOSPADM

## 2025-03-21 RX ORDER — SODIUM CHLORIDE 9 MG/ML
INJECTION, SOLUTION INTRAVENOUS PRN
Status: DISCONTINUED | OUTPATIENT
Start: 2025-03-21 | End: 2025-03-25 | Stop reason: HOSPADM

## 2025-03-21 RX ORDER — POLYETHYLENE GLYCOL 3350 17 G/17G
17 POWDER, FOR SOLUTION ORAL DAILY PRN
Status: DISCONTINUED | OUTPATIENT
Start: 2025-03-21 | End: 2025-03-25 | Stop reason: HOSPADM

## 2025-03-21 RX ORDER — LANOLIN ALCOHOL/MO/W.PET/CERES
100 CREAM (GRAM) TOPICAL DAILY
Status: DISCONTINUED | OUTPATIENT
Start: 2025-03-21 | End: 2025-03-25 | Stop reason: HOSPADM

## 2025-03-21 RX ADMIN — WATER: 100 IRRIGANT IRRIGATION at 22:56

## 2025-03-21 RX ADMIN — SODIUM CHLORIDE, PRESERVATIVE FREE 5 ML: 5 INJECTION INTRAVENOUS at 20:26

## 2025-03-21 RX ADMIN — PYRIDOXINE HCL TAB 50 MG 100 MG: 50 TAB at 20:33

## 2025-03-21 RX ADMIN — APIXABAN 2.5 MG: 2.5 TABLET, FILM COATED ORAL at 20:26

## 2025-03-21 RX ADMIN — WATER 1000 MG: 1 INJECTION INTRAMUSCULAR; INTRAVENOUS; SUBCUTANEOUS at 20:24

## 2025-03-21 RX ADMIN — FLUTICASONE PROPIONATE 2 SPRAY: 50 SPRAY, METERED NASAL at 20:33

## 2025-03-21 NOTE — H&P
Hospitalist Admission Note      NAME:  Jorge Hi   :  1940   MRN:  [id number]     Date/Time:  3/21/2025 4:29 PM    Patient PCP: Christiano Wasserman MD    ________________________________________________________________________    Given the patient's current clinical presentation, I have a high level of concern for decompensation if discharged from the emergency department.  Complex decision making was performed, which includes reviewing the patient's available past medical records, laboratory results, and x-ray films.       My assessment of this patient's clinical condition and my plan of care is as follows.    Assessment / Plan:  Patient is a 85-year-old male with a history of A-fib, CAD, hypertension, GERD, heart failure, hyperlipidemia, CKD stage III comes to the hospital with altered mental status and AVRIL.    Metabolic encephalopathy  Probably related to renal disease and hyperammonemia.  Infectious etiology cannot be ruled out.  Infectious etiology cannot be ruled out.  Patient has elevated ammonia level of 42, ethanol level is 12, creatinine is 1.99.  He also tested positive for opioids.  Initial lactic acid level is 2.11.  CT head is negative for any acute findings.  Start IV Rocephin empirically.  Repeat lactic acid.  Patient also has a sleep apnea and is noncompliant.  Lactulose enema today.  MRI brain if mental status does not improve.    2.  AVRIL on CKD stage III  Avoid nephrotoxic medications.  Nephrology consult    3.  Chronic A-fib  Status post PPM 5 years back  Continue Eliquis.    4.  CAD  Patient had a CABG done in .  Continue statins and Eliquis.    5.  Hypertension  Patient is on valsartan and Bumex.  Holding BP meds for now.    6.  GERD  Start Protonix.    7.  Hyperlipidemia  Holding Lipitor at this time.    8.  Chronic HFpEF  Patient is on Bumex and valsartan at home.  Last EF is 60 to 65% in .  Repeat echocardiogram.        I have personally reviewed the radiographs,

## 2025-03-21 NOTE — ED PROVIDER NOTES
[facility] EMERGENCY DEPARTMENT  EMERGENCY DEPARTMENT ENCOUNTER      Pt Name: Jorge Hi  MRN: [id number]  Birthdate 1940  Date of evaluation: 3/21/2025  Provider: Lovely Saldana MD    CHIEF COMPLAINT       Chief Complaint   Patient presents with    Altered Mental Status         HISTORY OF PRESENT ILLNESS    Jorge Hi is an 84 yo M with h/o CAD, CHF, HTN was sent to ED by PCP for admission 4 days ago due to rising creatinine and LFTs but left AMA that day.  Brought to ED by daughter due to increasing confusion and ataxia.            Additional history from independent historians:     Review of External Medical Records:     Nursing Notes were reviewed.    REVIEW OF SYSTEMS       Review of Systems    Except as noted above the remainder of the review of systems was reviewed and negative.       PAST MEDICAL HISTORY     Past Medical History:   Diagnosis Date    Arrhythmia     hx afib    Arthritis     Cancer (HCC)     basal & squamous cell removed   chest & arms    Chronic pain     neck & shoulders    Coronary atherosclerosis of native coronary artery 2/15/2011    2 sets stents & CABG    Essential hypertension, benign 2/15/2011    GERD (gastroesophageal reflux disease)     Heart failure (HCC)     Mixed hyperlipidemia 2/15/2011    Renal insufficiency 03/04/2019    Cr 1.57         SURGICAL HISTORY       Past Surgical History:   Procedure Laterality Date    HEENT  03/2018    laser surgery to correct visual problems after caratact surgery    HEENT  02/2018    cataract surgery bilat c no lens implnts    ORTHOPEDIC SURGERY Bilateral 2007    partial knee replacemets bilat    PACEMAKER  02/14/2019    pacemaker inserted    AK ABDOMEN SURGERY PROC UNLISTED  2009    left inguinal hernia repair    AK CARDIAC SURG PROCEDURE UNLIST  2009    CABG    AK CARDIAC SURG PROCEDURE UNLIST  2003    stents x 4    AK CARDIAC SURG PROCEDURE UNLIST  2007    stents x 4    AK CARDIAC SURG PROCEDURE UNLIST  06/2018

## 2025-03-21 NOTE — ED TRIAGE NOTES
PT daughter sts pt has major heart conditions. Pt is in renal failure GFR 30%.     PT daughter sts she went to the pt house around 11am and noticed he was shuffling his feet and not able to really walk    PT daughter sts his words were incoherent, slurred but she sts this is almost becoming his baseline but seemed worse today.     Pt daughter sts when he woke up this AM he was his normal self    PT daughter sts he was here last Friday with similar symptoms.

## 2025-03-22 ENCOUNTER — APPOINTMENT (OUTPATIENT)
Facility: HOSPITAL | Age: 85
DRG: 071 | End: 2025-03-22
Payer: MEDICARE

## 2025-03-22 ENCOUNTER — APPOINTMENT (OUTPATIENT)
Facility: HOSPITAL | Age: 85
DRG: 071 | End: 2025-03-22
Attending: HOSPITALIST
Payer: MEDICARE

## 2025-03-22 LAB
AMMONIA PLAS-SCNC: 53 UMOL/L
ANION GAP SERPL CALC-SCNC: 9 MMOL/L (ref 2–12)
B PERT DNA SPEC QL NAA+PROBE: NOT DETECTED
BASOPHILS # BLD: 0.06 K/UL (ref 0–0.1)
BASOPHILS NFR BLD: 0.7 % (ref 0–1)
BORDETELLA PARAPERTUSSIS BY PCR: NOT DETECTED
BUN SERPL-MCNC: 36 MG/DL (ref 6–20)
BUN/CREAT SERPL: 21 (ref 12–20)
C PNEUM DNA SPEC QL NAA+PROBE: NOT DETECTED
CALCIUM SERPL-MCNC: 8.7 MG/DL (ref 8.5–10.1)
CHLORIDE SERPL-SCNC: 113 MMOL/L (ref 97–108)
CO2 SERPL-SCNC: 20 MMOL/L (ref 21–32)
CREAT SERPL-MCNC: 1.71 MG/DL (ref 0.7–1.3)
CRP SERPL-MCNC: 3.61 MG/DL (ref 0–0.3)
DIFFERENTIAL METHOD BLD: ABNORMAL
ECHO AO ARCH DIAM: 2.6 CM
ECHO AO ROOT DIAM: 3.5 CM
ECHO AO ROOT INDEX: 1.72 CM/M2
ECHO AR MAX VEL PISA: 3.9 M/S
ECHO AV AREA PEAK VELOCITY: 3.1 CM2
ECHO AV AREA PEAK VELOCITY: 3.5 CM2
ECHO AV AREA VTI: 3.1 CM2
ECHO AV AREA/BSA VTI: 1.5 CM2/M2
ECHO AV MEAN GRADIENT: 8 MMHG
ECHO AV MEAN VELOCITY: 1.3 M/S
ECHO AV PEAK GRADIENT: 10 MMHG
ECHO AV PEAK GRADIENT: 13 MMHG
ECHO AV PEAK VELOCITY: 1.6 M/S
ECHO AV PEAK VELOCITY: 1.8 M/S
ECHO AV REGURGITANT PHT: 647.3 MS
ECHO AV VTI: 30.2 CM
ECHO BSA: 2.06 M2
ECHO EST RA PRESSURE: 8 MMHG
ECHO LA DIAMETER INDEX: 2.4 CM/M2
ECHO LA DIAMETER: 4.9 CM
ECHO LA TO AORTIC ROOT RATIO: 1.4
ECHO LA VOL A-L A2C: 117 ML (ref 18–58)
ECHO LA VOL A-L A4C: 88 ML (ref 18–58)
ECHO LA VOL BP: 96 ML (ref 18–58)
ECHO LA VOL MOD A2C: 110 ML (ref 18–58)
ECHO LA VOL MOD A4C: 81 ML (ref 18–58)
ECHO LA VOL/BSA BIPLANE: 47 ML/M2 (ref 16–34)
ECHO LA VOLUME AREA LENGTH: 104 ML
ECHO LA VOLUME INDEX A-L A2C: 57 ML/M2 (ref 16–34)
ECHO LA VOLUME INDEX A-L A4C: 43 ML/M2 (ref 16–34)
ECHO LA VOLUME INDEX AREA LENGTH: 51 ML/M2 (ref 16–34)
ECHO LA VOLUME INDEX MOD A2C: 54 ML/M2 (ref 16–34)
ECHO LA VOLUME INDEX MOD A4C: 40 ML/M2 (ref 16–34)
ECHO LV E' LATERAL VELOCITY: 6.31 CM/S
ECHO LV E' SEPTAL VELOCITY: 3.19 CM/S
ECHO LV EDV A2C: 146 ML
ECHO LV EDV A4C: 133 ML
ECHO LV EDV BP: 141 ML (ref 67–155)
ECHO LV EDV INDEX A4C: 65 ML/M2
ECHO LV EDV INDEX BP: 69 ML/M2
ECHO LV EDV NDEX A2C: 72 ML/M2
ECHO LV EF PHYSICIAN: 38 %
ECHO LV EJECTION FRACTION A2C: 30 %
ECHO LV EJECTION FRACTION A4C: 48 %
ECHO LV EJECTION FRACTION BIPLANE: 38 % (ref 55–100)
ECHO LV ESV A2C: 101 ML
ECHO LV ESV A4C: 69 ML
ECHO LV ESV BP: 88 ML (ref 22–58)
ECHO LV ESV INDEX A2C: 50 ML/M2
ECHO LV ESV INDEX A4C: 34 ML/M2
ECHO LV ESV INDEX BP: 43 ML/M2
ECHO LV FRACTIONAL SHORTENING: 10 % (ref 28–44)
ECHO LV INTERNAL DIMENSION DIASTOLE INDEX: 2.94 CM/M2
ECHO LV INTERNAL DIMENSION DIASTOLIC: 6 CM (ref 4.2–5.9)
ECHO LV INTERNAL DIMENSION SYSTOLIC INDEX: 2.65 CM/M2
ECHO LV INTERNAL DIMENSION SYSTOLIC: 5.4 CM
ECHO LV IVSD: 0.7 CM (ref 0.6–1)
ECHO LV MASS 2D: 158 G (ref 88–224)
ECHO LV MASS INDEX 2D: 77.5 G/M2 (ref 49–115)
ECHO LV POSTERIOR WALL DIASTOLIC: 0.7 CM (ref 0.6–1)
ECHO LV RELATIVE WALL THICKNESS RATIO: 0.23
ECHO LVOT AREA: 4.9 CM2
ECHO LVOT AV VTI INDEX: 0.63
ECHO LVOT DIAM: 2.5 CM
ECHO LVOT MEAN GRADIENT: 3 MMHG
ECHO LVOT PEAK GRADIENT: 5 MMHG
ECHO LVOT PEAK VELOCITY: 1.1 M/S
ECHO LVOT STROKE VOLUME INDEX: 45.7 ML/M2
ECHO LVOT SV: 93.2 ML
ECHO LVOT VTI: 19 CM
ECHO MV A VELOCITY: 0.38 M/S
ECHO MV E DECELERATION TIME (DT): 274.3 MS
ECHO MV E VELOCITY: 0.79 M/S
ECHO MV E/A RATIO: 2.08
ECHO MV E/E' LATERAL: 12.52
ECHO MV E/E' RATIO (AVERAGED): 18.64
ECHO MV E/E' SEPTAL: 24.76
ECHO MV EROA PISA: 0.1 CM2
ECHO MV REGURGITANT ALIASING (NYQUIST) VELOCITY: 32 CM/S
ECHO MV REGURGITANT PEAK VELOCITY: 2.5 M/S
ECHO MV REGURGITANT PEAK VELOCITY: 4.3 M/S
ECHO MV REGURGITANT RADIUS PISA: 0.38 CM
ECHO MV REGURGITANT VELOCITY PISA: 5 M/S
ECHO MV REGURGITANT VTIA: 134.6 CM
ECHO MV REGURGITANT VTIA: 70.6 CM
ECHO PULMONARY ARTERY END DIASTOLIC PRESSURE: 9 MMHG
ECHO PV MAX VELOCITY: 1 M/S
ECHO PV PEAK GRADIENT: 4 MMHG
ECHO PV REGURGITANT MAX VELOCITY: 1.5 M/S
ECHO RIGHT VENTRICULAR SYSTOLIC PRESSURE (RVSP): 62 MMHG
ECHO RV FREE WALL PEAK S': 10.1 CM/S
ECHO RV INTERNAL DIMENSION: 6.2 CM
ECHO RV TAPSE: 1.4 CM (ref 1.7–?)
ECHO TV REGURGITANT MAX VELOCITY: 3.67 M/S
ECHO TV REGURGITANT PEAK GRADIENT: 54 MMHG
EOSINOPHIL # BLD: 0.28 K/UL (ref 0–0.4)
EOSINOPHIL NFR BLD: 3.2 % (ref 0–7)
ERYTHROCYTE [DISTWIDTH] IN BLOOD BY AUTOMATED COUNT: 20.4 % (ref 11.5–14.5)
FLUAV SUBTYP SPEC NAA+PROBE: NOT DETECTED
FLUBV RNA SPEC QL NAA+PROBE: NOT DETECTED
GLUCOSE SERPL-MCNC: 92 MG/DL (ref 65–100)
HADV DNA SPEC QL NAA+PROBE: NOT DETECTED
HCOV 229E RNA SPEC QL NAA+PROBE: NOT DETECTED
HCOV HKU1 RNA SPEC QL NAA+PROBE: DETECTED
HCOV NL63 RNA SPEC QL NAA+PROBE: NOT DETECTED
HCOV OC43 RNA SPEC QL NAA+PROBE: NOT DETECTED
HCT VFR BLD AUTO: 36.6 % (ref 36.6–50.3)
HGB BLD-MCNC: 10.9 G/DL (ref 12.1–17)
HMPV RNA SPEC QL NAA+PROBE: NOT DETECTED
HPIV1 RNA SPEC QL NAA+PROBE: NOT DETECTED
HPIV2 RNA SPEC QL NAA+PROBE: NOT DETECTED
HPIV3 RNA SPEC QL NAA+PROBE: NOT DETECTED
HPIV4 RNA SPEC QL NAA+PROBE: NOT DETECTED
IMM GRANULOCYTES # BLD AUTO: 0.06 K/UL (ref 0–0.04)
IMM GRANULOCYTES NFR BLD AUTO: 0.7 % (ref 0–0.5)
LACTATE SERPL-SCNC: 2.4 MMOL/L (ref 0.4–2)
LACTATE SERPL-SCNC: 2.6 MMOL/L (ref 0.4–2)
LYMPHOCYTES # BLD: 1.29 K/UL (ref 0.8–3.5)
LYMPHOCYTES NFR BLD: 14.7 % (ref 12–49)
M PNEUMO DNA SPEC QL NAA+PROBE: NOT DETECTED
MCH RBC QN AUTO: 27.3 PG (ref 26–34)
MCHC RBC AUTO-ENTMCNC: 29.8 G/DL (ref 30–36.5)
MCV RBC AUTO: 91.7 FL (ref 80–99)
MONOCYTES # BLD: 0.84 K/UL (ref 0–1)
MONOCYTES NFR BLD: 9.5 % (ref 5–13)
NEUTS SEG # BLD: 6.27 K/UL (ref 1.8–8)
NEUTS SEG NFR BLD: 71.2 % (ref 32–75)
NRBC # BLD: 0 K/UL (ref 0–0.01)
NRBC BLD-RTO: 0 PER 100 WBC
NT PRO BNP: ABNORMAL PG/ML
PLATELET # BLD AUTO: 260 K/UL (ref 150–400)
PMV BLD AUTO: 11 FL (ref 8.9–12.9)
POTASSIUM SERPL-SCNC: 4.3 MMOL/L (ref 3.5–5.1)
PROCALCITONIN SERPL-MCNC: 0.1 NG/ML
RBC # BLD AUTO: 3.99 M/UL (ref 4.1–5.7)
RBC MORPH BLD: ABNORMAL
RBC MORPH BLD: ABNORMAL
RSV RNA SPEC QL NAA+PROBE: NOT DETECTED
RV+EV RNA SPEC QL NAA+PROBE: NOT DETECTED
SARS-COV-2 RNA RESP QL NAA+PROBE: NOT DETECTED
SODIUM SERPL-SCNC: 142 MMOL/L (ref 136–145)
WBC # BLD AUTO: 8.8 K/UL (ref 4.1–11.1)

## 2025-03-22 PROCEDURE — 80048 BASIC METABOLIC PNL TOTAL CA: CPT

## 2025-03-22 PROCEDURE — 92610 EVALUATE SWALLOWING FUNCTION: CPT

## 2025-03-22 PROCEDURE — 6370000000 HC RX 637 (ALT 250 FOR IP): Performed by: STUDENT IN AN ORGANIZED HEALTH CARE EDUCATION/TRAINING PROGRAM

## 2025-03-22 PROCEDURE — 2060000000 HC ICU INTERMEDIATE R&B

## 2025-03-22 PROCEDURE — 82140 ASSAY OF AMMONIA: CPT

## 2025-03-22 PROCEDURE — 93306 TTE W/DOPPLER COMPLETE: CPT

## 2025-03-22 PROCEDURE — 99223 1ST HOSP IP/OBS HIGH 75: CPT | Performed by: INTERNAL MEDICINE

## 2025-03-22 PROCEDURE — 2500000003 HC RX 250 WO HCPCS: Performed by: HOSPITALIST

## 2025-03-22 PROCEDURE — 84145 PROCALCITONIN (PCT): CPT

## 2025-03-22 PROCEDURE — 6370000000 HC RX 637 (ALT 250 FOR IP): Performed by: HOSPITALIST

## 2025-03-22 PROCEDURE — 86140 C-REACTIVE PROTEIN: CPT

## 2025-03-22 PROCEDURE — 94761 N-INVAS EAR/PLS OXIMETRY MLT: CPT

## 2025-03-22 PROCEDURE — 51798 US URINE CAPACITY MEASURE: CPT

## 2025-03-22 PROCEDURE — 6360000002 HC RX W HCPCS: Performed by: HOSPITALIST

## 2025-03-22 PROCEDURE — 0202U NFCT DS 22 TRGT SARS-COV-2: CPT

## 2025-03-22 PROCEDURE — 6360000004 HC RX CONTRAST MEDICATION: Performed by: STUDENT IN AN ORGANIZED HEALTH CARE EDUCATION/TRAINING PROGRAM

## 2025-03-22 PROCEDURE — 6360000002 HC RX W HCPCS: Performed by: STUDENT IN AN ORGANIZED HEALTH CARE EDUCATION/TRAINING PROGRAM

## 2025-03-22 PROCEDURE — 85025 COMPLETE CBC W/AUTO DIFF WBC: CPT

## 2025-03-22 PROCEDURE — 93306 TTE W/DOPPLER COMPLETE: CPT | Performed by: INTERNAL MEDICINE

## 2025-03-22 PROCEDURE — 83880 ASSAY OF NATRIURETIC PEPTIDE: CPT

## 2025-03-22 PROCEDURE — 36415 COLL VENOUS BLD VENIPUNCTURE: CPT

## 2025-03-22 PROCEDURE — 71275 CT ANGIOGRAPHY CHEST: CPT

## 2025-03-22 PROCEDURE — 83605 ASSAY OF LACTIC ACID: CPT

## 2025-03-22 RX ORDER — IPRATROPIUM BROMIDE AND ALBUTEROL SULFATE 2.5; .5 MG/3ML; MG/3ML
1 SOLUTION RESPIRATORY (INHALATION) ONCE
Status: DISCONTINUED | OUTPATIENT
Start: 2025-03-22 | End: 2025-03-25 | Stop reason: HOSPADM

## 2025-03-22 RX ORDER — FUROSEMIDE 10 MG/ML
40 INJECTION INTRAMUSCULAR; INTRAVENOUS 2 TIMES DAILY
Status: DISCONTINUED | OUTPATIENT
Start: 2025-03-22 | End: 2025-03-25 | Stop reason: HOSPADM

## 2025-03-22 RX ORDER — LACTULOSE 10 G/15ML
20 SOLUTION ORAL 3 TIMES DAILY
Status: DISCONTINUED | OUTPATIENT
Start: 2025-03-22 | End: 2025-03-25 | Stop reason: HOSPADM

## 2025-03-22 RX ORDER — BENZONATATE 100 MG/1
100 CAPSULE ORAL 3 TIMES DAILY PRN
Status: DISCONTINUED | OUTPATIENT
Start: 2025-03-22 | End: 2025-03-25 | Stop reason: HOSPADM

## 2025-03-22 RX ORDER — IOPAMIDOL 755 MG/ML
100 INJECTION, SOLUTION INTRAVASCULAR
Status: COMPLETED | OUTPATIENT
Start: 2025-03-22 | End: 2025-03-22

## 2025-03-22 RX ORDER — GUAIFENESIN 600 MG/1
1200 TABLET, EXTENDED RELEASE ORAL 2 TIMES DAILY
Status: DISCONTINUED | OUTPATIENT
Start: 2025-03-22 | End: 2025-03-25 | Stop reason: HOSPADM

## 2025-03-22 RX ADMIN — LACTULOSE 20 G: 10 SOLUTION ORAL at 20:47

## 2025-03-22 RX ADMIN — PYRIDOXINE HCL TAB 50 MG 100 MG: 50 TAB at 08:31

## 2025-03-22 RX ADMIN — GUAIFENESIN 1200 MG: 600 TABLET ORAL at 20:47

## 2025-03-22 RX ADMIN — FUROSEMIDE 40 MG: 10 INJECTION, SOLUTION INTRAMUSCULAR; INTRAVENOUS at 14:00

## 2025-03-22 RX ADMIN — LACTULOSE 20 G: 10 SOLUTION ORAL at 14:00

## 2025-03-22 RX ADMIN — SODIUM CHLORIDE, PRESERVATIVE FREE 10 ML: 5 INJECTION INTRAVENOUS at 08:31

## 2025-03-22 RX ADMIN — SODIUM CHLORIDE, PRESERVATIVE FREE 10 ML: 5 INJECTION INTRAVENOUS at 21:00

## 2025-03-22 RX ADMIN — IOPAMIDOL 79 ML: 755 INJECTION, SOLUTION INTRAVENOUS at 19:04

## 2025-03-22 RX ADMIN — WATER 1000 MG: 1 INJECTION INTRAMUSCULAR; INTRAVENOUS; SUBCUTANEOUS at 20:57

## 2025-03-22 RX ADMIN — FLUTICASONE PROPIONATE 2 SPRAY: 50 SPRAY, METERED NASAL at 08:32

## 2025-03-22 RX ADMIN — APIXABAN 2.5 MG: 2.5 TABLET, FILM COATED ORAL at 08:31

## 2025-03-22 RX ADMIN — BENZONATATE 100 MG: 100 CAPSULE ORAL at 20:56

## 2025-03-22 NOTE — CONSULTS
85-year-old male with a history of A-fib, CAD, hypertension, GERD, heart failure, hyperlipidemia, CKD stage III admitted for altered mental status and AVRIL.   Urology consulted for evaluation of hematuria. No urine to assess.  No concern for retention bladder scan of 157.    Per bedside RN, patient's daughter reports urine appeared dark bandar this am, no reports of hematuria.    VSS, afebrile  No leukocytosis, cr- 1.71  UA + RBCS >100, not c/w UTI  No  imaging    - nursing to rack urine for assessment( 1 sample per shift)  - hydrate, hold AC as able  - monitor for worsening hematuria, notify urology    Will see in am

## 2025-03-22 NOTE — PROGRESS NOTES
Unable to do a complete medrec. Patient poor historian and daughter (Rayna) cannot remember his medicines.

## 2025-03-22 NOTE — PLAN OF CARE
Speech LAnguage Pathology EVALUATION/DISCHARGE    Patient: Jorge Hi (85 y.o. male)  Date: 3/22/2025  Primary Diagnosis: Encephalopathy [G93.40]  Serum total bilirubin elevated [R17]  Increased ammonia level [R79.89]  Chronic kidney disease, unspecified CKD stage [N18.9]  Encephalopathy, unspecified type [G93.40]       Precautions:                     ASSESSMENT :  Patient presents with seemingly functional oropharyngeal swallow function. Oral structures and functions seemingly WNL. Patient independently fed himself sips of thin via straw, puree, and solid. No immediate overt clinical s/s aspiration noted. Significantly delayed cough (1-2 minutes following PO trials) noted. Per chart review, patient with h/o alcohol use disorder and GERD. Question possible esophageal component. Patient also reports occasional globus sensation at sternal notch area during meals. No further acute SLP services warranted at this time. SLP will sign off.     Patient will be discharged from skilled speech-language pathology services at this time.     PLAN :  Recommendations and Planned Interventions:  Diet: Regular and thin liquids  --Medications as tolerated   --Upright all PO intake   --Oral hygiene 2-3x/day    Reflux precautions:  - Sit fully upright at 90 for meals (prefer that meals are eaten while sitting in a chair)  - Remain up for 1-hour after meals  - Consider 6-smaller meals throughout the day (eat about every 2-3 hours) instead of large meals  - Avoid acidic foods (tomato based, citrus fruits, alcohol, high-fat dairy, caffeine, fried foods)  - Trial warm, de-caffeinated beverages with meals to improve esophageal clearance  - Sleep w/ HOB elevated (30 degrees)  - Stop eating/drinking anything except water 2-hours before bedtime  - Avoid strenuous activity after meals (walking is OK)       Acute SLP Services: No, patient will be discharged from acute skilled speech-language pathology at this time.  Discharge

## 2025-03-22 NOTE — PROGRESS NOTES
[facility]                                                                                          Hospitalist Progress Note  Wilner Buckley MD  Office Phone: [phone]        Date of Service:  3/22/2025  NAME:  Jorge Hi  :  1940  MRN:  [id number]       Admission HPI:   Jorge is a 85 y.o.  male with PMHx hyperlipidemia, sleep apnea, CKD, GERD, hypertension, CAD, basal and schema cell carcinoma, A-fib history comes to the hospital with chief complaint of altered mental status.  Patient was seen in the ER almost 4 days back with fatigue and had a elevated creatinine and LFTs but he left AMA at that time.  He was brought back today because of confusion and ataxia.  As per the daughter she went to his house today around 11 AM and she noted that patient had a shuffling gait and was not able to walk.  He also had a slurred speech.  He was feeling normal when he woke up this morning.  There is no fever or chills.  No nausea vomiting diarrhea or constipation.  No chest pain or shortness of breath.  When he arrived to the ER his blood pressure was 123/80, pulse 85, respiratory rate 18, temperature 97.3, pulse ox 98% room air.  CT scan of head is negative for any acute findings.  Ammonia level 42  Ethanol level 12  Blood work showed a sodium 141, potassium 3.8, creatinine 1.99, lactic acid 2.11, proBNP 14,000, WBC 7.8, hemoglobin 11.7, platelet count 289.  UA is negative for UTI.  UDS is positive for opiates.       Interval history / Subjective:   3/22  Reports dizziness and ataxia. Also cough and dyspnea. Daughter reports cough after eating. Urine is brown.    Son says he has had dizziness for at least a year, orthopnea for several months. Family reports MI in 2024, multiple ablations.    On Monday, PCP called son re increased Cr, presented to ED, left AMA, saw PCP again on Wednesday. Had labored breathing and fatigue Wednesday and Thursday.    Yesterday  home  -CM consult for placement  -PT and OT when stable    Chronic alcohol use disorder  -ciwa. Alcohol level was low on admission but daughter states that pt chronically drinks in excess  -consult psych when more stable    I spent 95 minutes in caring for this patient today     Code status: full. Would revisit  DVT Prophylaxis: SCD's  Anticipated Disposition: will likely need LTC facility vs live with family long term. May need SNF vs rehab in short term       Review of Systems:   Pertinent symptoms noted in the HPI    Vital Signs:    Last 24hrs VS reviewed since prior progress note. Most recent are:  Vitals:    03/22/25 1007   BP: 131/81   Pulse:    Resp:    Temp:    SpO2:        Physical Examination:     I had a face to face encounter with this patient and independently examined them on 3/22/2025 as outlined below:        General : alert and awake, no acute distress. coughing  HEENT: moist mucus membranes  Chest: dec BS at bases. Inc WOB, coughing  CVS: S1 and S2 heard, no m/g/r. tachycardic  Abd: non distended  Ext: WWP, 3+ edema in bobby LE's  Neuro/Psych: Aox2, some responses are incoherent  Skin: no rash      Labs and imaging:     Recent Labs     03/21/25  1422 03/22/25  0316   WBC 7.8 8.8   HGB 11.7* 10.9*   HCT 39.3 36.6    260     Recent Labs     03/21/25  1422 03/22/25  0316    142   K 3.8 4.3   * 113*   CO2 22 20*   BUN 41* 36*     Recent Labs     03/21/25  1422   ALT 69   GLOB 3.2     No results for input(s): \"INR\", \"APTT\" in the last 72 hours.    Invalid input(s): \"PTP\"   No results for input(s): \"TIBC\" in the last 72 hours.    Invalid input(s): \"FE\", \"PSAT\", \"FERR\"   No results found for: \"RBCF\"   No results for input(s): \"PH\", \"PCO2\", \"PO2\" in the last 72 hours.  No results for input(s): \"CPK\" in the last 72 hours.    Invalid input(s): \"CPKMB\", \"CKNDX\", \"TROIQ\"  Lab Results   Component Value Date/Time    CHOL 103 06/16/2024 05:30 AM    HDL 45 06/16/2024 05:30 AM    LDL 41.6

## 2025-03-22 NOTE — CONSULTS
[facility] CARDIOLOGY                    Cardiology Care Note     [x]Initial Encounter     []Follow-up    Patient Name: Jorge Hi - :1940 - MRN:[id number]  Primary Cardiologist: Dr. Gilligan and Dr. Yan Delgado.   Consulting Cardiologist: Milind Luu MD     Reason for encounter: CHF    HPI:   Jorge Hi is a 85 y.o. male with PMH significant for CAD, CABG, stents, atrial fibrillation status post ablation, who is here for an episode of syncope that occurred yesterday evening when he was having dinner with his daughter.  His daughter saw him get & nearly passed out but did not completely lose consciousness.  2 members of his family helped him get into a car and they brought him over here to the ER.  He did not have any symptoms of chest pain, palpitations.  He has been having these episodes from last few weeks.  He has seen his cardiologist in January and no changes were made.  In 2024 he underwent a stent by his cardiologist.  EKG at presentation demonstrated atrial fibrillation with heart rate of 82 bpm with PVCs and left axis deviation with left anterior fascicular block and right bundle branch block.        Most recent HS troponins:  Recent Labs     25  1422   TROPHS 75        Assessment and Plan     1.  Syncope: Unclear if this is cardiac in nature.  He has a pacemaker therefore bradyarrthymias cannot be the cause.  We will interrogate his pacemaker.  His blood pressure has been normal therefore cannot explain if there is any other cardiac cause for syncope.  LVEF is 38%.  He will need outpatient Holter monitoring which can be arranged with Dr. Gilligan's office.    2. History of CAD/CABG: Recent stent performed in 2024.  No records available.  Continue medical management    3. History of atrial fibrillation: Continue Eliquis.    4.History of pacemaker       ____________________________________________________________    Cardiac testing  25    ECHO (TTE) COMPLETE  stents x 4    ND CARDIAC SURG PROCEDURE UNLIST  06/2018    ablation for afib    ND CARDIAC SURG PROCEDURE UNLIST  02/2018    ablation    UROLOGICAL SURGERY      vasectomy     Social Hx:  reports that he quit smoking about 61 years ago. His smoking use included cigarettes. He has never used smokeless tobacco. He reports current alcohol use of about 3.0 standard drinks of alcohol per week. He reports that he does not use drugs.  Family Hx: family history is not on file.  Allergies   Allergen Reactions    Meloxicam Other (See Comments)     Fast heart beat    Omeprazole Other (See Comments)     States causes \"weakness.\"          OBJECTIVE:  Wt Readings from Last 3 Encounters:   03/22/25 85.7 kg (189 lb)   03/17/25 86.2 kg (190 lb)   06/15/24 88.5 kg (195 lb)     I/O last 3 completed shifts:  In: 400 [P.O.:400]  Out: 100 [Urine:100]  No intake/output data recorded.    Physical Exam:    Vitals:   Vitals:    03/22/25 0700 03/22/25 0753 03/22/25 1007 03/22/25 1145   BP:  131/81 131/81 126/75   Pulse: 94 86  77   Resp:  16 22   Temp:  98.2 °F (36.8 °C)  97.3 °F (36.3 °C)   TempSrc:    Oral   SpO2:  97%  97%   Weight:   85.7 kg (189 lb)    Height:   1.778 m (5' 10\")        Gen: Well-developed, well-nourished, in no acute distress  Neck: Supple, No JVD, No Carotid Bruit  Resp: No accessory muscle use, Clear breath sounds, No rales or rhonchi  Card: Regular Rate,Rythm, Normal S1, S2, No murmurs, rubs or gallop. No thrills.   Abd:   Soft, non-tender, non-distended, BS+   MSK: No cyanosis  Skin: No rashes    Neuro: Moving all four extremities, follows commands appropriately  Psych: Good insight, oriented to person, place, alert, Nml Affect  LE: No edema    Data Review:     Radiology:   XR Results (most recent):  Xray Result (most recent):  XR CHEST PORTABLE 03/21/2025    Narrative  EXAM:  XR CHEST PORTABLE    CLINICAL INDICATION:  Male, 85 years old with altered    COMPARISON:  None    TECHNIQUE:  Single view(s) of the chest

## 2025-03-22 NOTE — PLAN OF CARE
Problem: Safety - Adult  Goal: Free from fall injury  Outcome: Progressing     Problem: Chronic Conditions and Co-morbidities  Goal: Patient's chronic conditions and co-morbidity symptoms are monitored and maintained or improved  Outcome: Progressing  Flowsheets (Taken 3/21/2025 2030)  Care Plan - Patient's Chronic Conditions and Co-Morbidity Symptoms are Monitored and Maintained or Improved:   Monitor and assess patient's chronic conditions and comorbid symptoms for stability, deterioration, or improvement   Collaborate with multidisciplinary team to address chronic and comorbid conditions and prevent exacerbation or deterioration     Problem: Skin/Tissue Integrity  Goal: Skin integrity remains intact  Description: 1.  Monitor for areas of redness and/or skin breakdown  2.  Assess vascular access sites hourly  3.  Every 4-6 hours minimum:  Change oxygen saturation probe site  4.  Every 4-6 hours:  If on nasal continuous positive airway pressure, respiratory therapy assess nares and determine need for appliance change or resting period  Outcome: Progressing  Flowsheets (Taken 3/21/2025 2030)  Skin Integrity Remains Intact:   Monitor for areas of redness and/or skin breakdown   Assess vascular access sites hourly

## 2025-03-23 ENCOUNTER — APPOINTMENT (OUTPATIENT)
Facility: HOSPITAL | Age: 85
DRG: 071 | End: 2025-03-23
Payer: MEDICARE

## 2025-03-23 LAB
ALBUMIN SERPL-MCNC: 2.6 G/DL (ref 3.5–5)
ALBUMIN/GLOB SERPL: 0.9 (ref 1.1–2.2)
ALP SERPL-CCNC: 146 U/L (ref 45–117)
ALT SERPL-CCNC: 50 U/L (ref 12–78)
AMMONIA PLAS-SCNC: 36 UMOL/L
ANION GAP SERPL CALC-SCNC: 7 MMOL/L (ref 2–12)
AST SERPL-CCNC: 31 U/L (ref 15–37)
BASOPHILS # BLD: 0.07 K/UL (ref 0–0.1)
BASOPHILS NFR BLD: 0.8 % (ref 0–1)
BILIRUB SERPL-MCNC: 1.6 MG/DL (ref 0.2–1)
BUN SERPL-MCNC: 31 MG/DL (ref 6–20)
BUN/CREAT SERPL: 19 (ref 12–20)
CALCIUM SERPL-MCNC: 8.5 MG/DL (ref 8.5–10.1)
CHLORIDE SERPL-SCNC: 110 MMOL/L (ref 97–108)
CO2 SERPL-SCNC: 22 MMOL/L (ref 21–32)
CREAT SERPL-MCNC: 1.59 MG/DL (ref 0.7–1.3)
DIFFERENTIAL METHOD BLD: ABNORMAL
EKG DIAGNOSIS: NORMAL
EKG Q-T INTERVAL: 424 MS
EKG QRS DURATION: 136 MS
EKG QTC CALCULATION (BAZETT): 495 MS
EKG R AXIS: -64 DEGREES
EKG T AXIS: 87 DEGREES
EKG VENTRICULAR RATE: 82 BPM
EOSINOPHIL # BLD: 0.36 K/UL (ref 0–0.4)
EOSINOPHIL NFR BLD: 4.3 % (ref 0–7)
ERYTHROCYTE [DISTWIDTH] IN BLOOD BY AUTOMATED COUNT: 20.6 % (ref 11.5–14.5)
GLOBULIN SER CALC-MCNC: 2.9 G/DL (ref 2–4)
GLUCOSE SERPL-MCNC: 101 MG/DL (ref 65–100)
HCT VFR BLD AUTO: 36 % (ref 36.6–50.3)
HGB BLD-MCNC: 10.9 G/DL (ref 12.1–17)
IMM GRANULOCYTES # BLD AUTO: 0.03 K/UL (ref 0–0.04)
IMM GRANULOCYTES NFR BLD AUTO: 0.4 % (ref 0–0.5)
LACTATE SERPL-SCNC: 1.5 MMOL/L (ref 0.4–2)
LYMPHOCYTES # BLD: 1.38 K/UL (ref 0.8–3.5)
LYMPHOCYTES NFR BLD: 16.4 % (ref 12–49)
MAGNESIUM SERPL-MCNC: 2.1 MG/DL (ref 1.6–2.4)
MCH RBC QN AUTO: 27.2 PG (ref 26–34)
MCHC RBC AUTO-ENTMCNC: 30.3 G/DL (ref 30–36.5)
MCV RBC AUTO: 89.8 FL (ref 80–99)
MONOCYTES # BLD: 0.81 K/UL (ref 0–1)
MONOCYTES NFR BLD: 9.7 % (ref 5–13)
NEUTS SEG # BLD: 5.75 K/UL (ref 1.8–8)
NEUTS SEG NFR BLD: 68.4 % (ref 32–75)
NRBC # BLD: 0 K/UL (ref 0–0.01)
NRBC BLD-RTO: 0 PER 100 WBC
PHOSPHATE SERPL-MCNC: 3.3 MG/DL (ref 2.6–4.7)
PLATELET # BLD AUTO: 254 K/UL (ref 150–400)
PMV BLD AUTO: 10.2 FL (ref 8.9–12.9)
POTASSIUM SERPL-SCNC: 3.8 MMOL/L (ref 3.5–5.1)
PROT SERPL-MCNC: 5.5 G/DL (ref 6.4–8.2)
RBC # BLD AUTO: 4.01 M/UL (ref 4.1–5.7)
RBC MORPH BLD: ABNORMAL
SODIUM SERPL-SCNC: 139 MMOL/L (ref 136–145)
WBC # BLD AUTO: 8.4 K/UL (ref 4.1–11.1)

## 2025-03-23 PROCEDURE — 84100 ASSAY OF PHOSPHORUS: CPT

## 2025-03-23 PROCEDURE — 2500000003 HC RX 250 WO HCPCS: Performed by: HOSPITALIST

## 2025-03-23 PROCEDURE — 80053 COMPREHEN METABOLIC PANEL: CPT

## 2025-03-23 PROCEDURE — 85025 COMPLETE CBC W/AUTO DIFF WBC: CPT

## 2025-03-23 PROCEDURE — 83735 ASSAY OF MAGNESIUM: CPT

## 2025-03-23 PROCEDURE — 2060000000 HC ICU INTERMEDIATE R&B

## 2025-03-23 PROCEDURE — 6370000000 HC RX 637 (ALT 250 FOR IP): Performed by: STUDENT IN AN ORGANIZED HEALTH CARE EDUCATION/TRAINING PROGRAM

## 2025-03-23 PROCEDURE — 94761 N-INVAS EAR/PLS OXIMETRY MLT: CPT

## 2025-03-23 PROCEDURE — 6360000002 HC RX W HCPCS: Performed by: STUDENT IN AN ORGANIZED HEALTH CARE EDUCATION/TRAINING PROGRAM

## 2025-03-23 PROCEDURE — 99233 SBSQ HOSP IP/OBS HIGH 50: CPT | Performed by: INTERNAL MEDICINE

## 2025-03-23 PROCEDURE — 82140 ASSAY OF AMMONIA: CPT

## 2025-03-23 PROCEDURE — 36415 COLL VENOUS BLD VENIPUNCTURE: CPT

## 2025-03-23 PROCEDURE — 6370000000 HC RX 637 (ALT 250 FOR IP): Performed by: HOSPITALIST

## 2025-03-23 PROCEDURE — 83605 ASSAY OF LACTIC ACID: CPT

## 2025-03-23 PROCEDURE — 93010 ELECTROCARDIOGRAM REPORT: CPT | Performed by: INTERNAL MEDICINE

## 2025-03-23 PROCEDURE — 76604 US EXAM CHEST: CPT

## 2025-03-23 RX ORDER — ZOLPIDEM TARTRATE 5 MG/1
5 TABLET ORAL NIGHTLY PRN
Status: DISCONTINUED | OUTPATIENT
Start: 2025-03-23 | End: 2025-03-25 | Stop reason: HOSPADM

## 2025-03-23 RX ADMIN — GUAIFENESIN 1200 MG: 600 TABLET ORAL at 20:05

## 2025-03-23 RX ADMIN — PYRIDOXINE HCL TAB 50 MG 100 MG: 50 TAB at 10:25

## 2025-03-23 RX ADMIN — SODIUM CHLORIDE, PRESERVATIVE FREE 10 ML: 5 INJECTION INTRAVENOUS at 20:05

## 2025-03-23 RX ADMIN — LACTULOSE 20 G: 10 SOLUTION ORAL at 14:21

## 2025-03-23 RX ADMIN — GUAIFENESIN 1200 MG: 600 TABLET ORAL at 10:17

## 2025-03-23 RX ADMIN — FUROSEMIDE 40 MG: 10 INJECTION, SOLUTION INTRAMUSCULAR; INTRAVENOUS at 10:17

## 2025-03-23 RX ADMIN — FUROSEMIDE 40 MG: 10 INJECTION, SOLUTION INTRAMUSCULAR; INTRAVENOUS at 18:31

## 2025-03-23 RX ADMIN — LACTULOSE 20 G: 10 SOLUTION ORAL at 10:17

## 2025-03-23 RX ADMIN — SODIUM CHLORIDE, PRESERVATIVE FREE 10 ML: 5 INJECTION INTRAVENOUS at 10:17

## 2025-03-23 RX ADMIN — FLUTICASONE PROPIONATE 2 SPRAY: 50 SPRAY, METERED NASAL at 11:01

## 2025-03-23 NOTE — PROGRESS NOTES
[facility] CARDIOLOGY                    Cardiology Care Note     []Initial Encounter     [x]Follow-up    Patient Name: Jorge Hi - :1940 - MRN:[id number]  Primary Cardiologist: Dr. Gilligan and Dr. Yan Delgado.   Consulting Cardiologist: Milind Luu MD     Reason for encounter: CHF    HPI:   Jorge Hi is a 85 y.o. male with PMH significant for CAD, CABG, stents, atrial fibrillation status post ablation, who is here for an episode of syncope that occurred yesterday evening when he was having dinner with his daughter.  His daughter saw him get & nearly passed out but did not completely lose consciousness.  2 members of his family helped him get into a car and they brought him over here to the ER.  He did not have any symptoms of chest pain, palpitations.  He has been having these episodes from last few weeks.  He has seen his cardiologist in January and no changes were made.  In 2024 he underwent a stent by his cardiologist.  EKG at presentation demonstrated atrial fibrillation with heart rate of 82 bpm with PVCs and left axis deviation with left anterior fascicular block and right bundle branch block.        Most recent HS troponins:  Recent Labs     25  1422   TROPHS 75        Assessment and Plan     1.  Syncope: Unclear if this is cardiac in nature.  He has a pacemaker therefore bradyarrthymias cannot be the cause.  We will interrogate his pacemaker.  His blood pressure has been normal therefore cannot explain if there is any other cardiac cause for syncope.  LVEF is 38%.  He will need outpatient Holter monitoring which can be arranged with Dr. Gilligan's office.    2. Acute on chronic CHF: Has large b/l pleural effusions. LVEF is 38%. Unclear if this is new or since Aug 2024 when he had PCI at [facility]. Will need records from [facility] office. Meanwhile continue diuresis. May need thoracentesis.     3. History of CAD/CABG: Recent stent performed in 2024.  No records available.   suggestive of  edema.  UPPER ABDOMEN: No acute pathology in the upper abdomen.  BONES: No aggressive bone lesion or fracture.    Impression  Moderate bilateral pleural effusions, cardiomegaly, and mild pulmonary edema.  No evidence of pulmonary embolism.      Electronically signed by IGGY TIRADO      Lab Results   Component Value Date/Time     03/23/2025 02:34 AM    K 3.8 03/23/2025 02:34 AM     03/23/2025 02:34 AM    CO2 22 03/23/2025 02:34 AM    BUN 31 03/23/2025 02:34 AM    CREATININE 1.59 03/23/2025 02:34 AM    GLUCOSE 101 03/23/2025 02:34 AM    CALCIUM 8.5 03/23/2025 02:34 AM    LABGLOM 42 03/23/2025 02:34 AM    LABGLOM 34 12/09/2022 02:51 AM      No results found for: \"BNP\"  Lab Results   Component Value Date    WBC 8.4 03/23/2025    HGB 10.9 (L) 03/23/2025    HCT 36.0 (L) 03/23/2025    MCV 89.8 03/23/2025     03/23/2025     No results for input(s): \"CHOL\", \"HDLC\", \"LDLC\", \"HBA1C\" in the last 72 hours.    Invalid input(s): \"TGL\"    Current meds:    Current Facility-Administered Medications:     furosemide (LASIX) injection 40 mg, 40 mg, IntraVENous, BID, Wilner Buckley MD, 40 mg at 03/23/25 1017    benzonatate (TESSALON) capsule 100 mg, 100 mg, Oral, TID PRN, Wilner Bucklye MD, 100 mg at 03/22/25 2056    lactulose (CHRONULAC) 10 GM/15ML solution 20 g, 20 g, Oral, TID, Wilner Buckley MD, 20 g at 03/23/25 1017    ipratropium 0.5 mg-albuterol 2.5 mg (DUONEB) nebulizer solution 1 Dose, 1 Dose, Inhalation, Once, Wilner Buckley MD    guaiFENesin (MUCINEX) extended release tablet 1,200 mg, 1,200 mg, Oral, BID, Wilner Buckley MD, 1,200 mg at 03/23/25 1017    [Held by provider] apixaban (ELIQUIS) tablet 2.5 mg, 2.5 mg, Oral, BID, Dagoberto Brunson MD, 2.5 mg at 03/22/25 0831    [Held by provider] atorvastatin (LIPITOR) tablet 40 mg, 40 mg, Oral, Nightly, Dagoberto Brunson MD    fluticasone (FLONASE) 50 MCG/ACT nasal spray 2 spray, 2 spray, Nasal, Daily, Dagoberto Brunson MD, 2 spray

## 2025-03-23 NOTE — CONSULTS
85-year-old male with a history of A-fib, CAD, hypertension, GERD, heart failure, hyperlipidemia, CKD stage III admitted for altered mental status and AVRIL.   Urology consulted for evaluation of hematuria. No urine to assess.  No concern for retention bladder scan of 157.    Per bedside RN, patient's daughter reports urine appeared dark bandar this am, no reports of hematuria.    VSS, afebrile  No leukocytosis, cr- 1.71  UA + RBCS >100, not c/w UTI  No  imaging    - nursing to rack urine for assessment( 1 sample per shift)  - hydrate, hold AC as able  - monitor for worsening hematuria, notify urology    Will see in am     3/23/25    VSS, afebrile  No leukocytosis, cr- 1.59, hgb- 10.9    Clear bandar urine noted in pure wick  OP FU with [facility] for micro hematuria work-up, can call to schedule at DC, [phone].    Urology will sign-off, please call with questions

## 2025-03-23 NOTE — PROGRESS NOTES
[facility]    [facility]  YOB: 1940          Assessment & Plan:     AVRIL/CKD unknown stage although likely CKD3b  - improving  - on lasix  - BNP was 11,775  - creat has been abnormal for some time  - + MRD and left kidney cortical thinning  - has UA with blood and protein  - lactic acid +  - CXR had pulm edema  - LVEF almost 40%   - appears to be acute HF related  - resolving AVRIL  - pt says his edema is almost gone in LE   - back in am          Subjective:   CC: AVRIL  HPI: Patient seen   ROS:  Current Facility-Administered Medications   Medication Dose Route Frequency    furosemide (LASIX) injection 40 mg  40 mg IntraVENous BID    benzonatate (TESSALON) capsule 100 mg  100 mg Oral TID PRN    lactulose (CHRONULAC) 10 GM/15ML solution 20 g  20 g Oral TID    ipratropium 0.5 mg-albuterol 2.5 mg (DUONEB) nebulizer solution 1 Dose  1 Dose Inhalation Once    guaiFENesin (MUCINEX) extended release tablet 1,200 mg  1,200 mg Oral BID    [Held by provider] apixaban (ELIQUIS) tablet 2.5 mg  2.5 mg Oral BID    [Held by provider] atorvastatin (LIPITOR) tablet 40 mg  40 mg Oral Nightly    fluticasone (FLONASE) 50 MCG/ACT nasal spray 2 spray  2 spray Nasal Daily    vitamin B-6 (PYRIDOXINE) tablet 100 mg  100 mg Oral Daily    sodium chloride flush 0.9 % injection 5-40 mL  5-40 mL IntraVENous 2 times per day    sodium chloride flush 0.9 % injection 5-40 mL  5-40 mL IntraVENous PRN    0.9 % sodium chloride infusion   IntraVENous PRN    ondansetron (ZOFRAN-ODT) disintegrating tablet 4 mg  4 mg Oral Q8H PRN    Or    ondansetron (ZOFRAN) injection 4 mg  4 mg IntraVENous Q6H PRN    polyethylene glycol (GLYCOLAX) packet 17 g  17 g Oral Daily PRN    acetaminophen (TYLENOL) tablet 650 mg  650 mg Oral Q6H PRN    Or    acetaminophen (TYLENOL) suppository 650 mg  650 mg Rectal Q6H PRN    cefTRIAXone (ROCEPHIN) 1,000 mg in sterile water 10 mL IV syringe  1,000 mg

## 2025-03-23 NOTE — PROGRESS NOTES
[facility]  [address] [place] [address] [phone]      Hospitalist Progress Note      NAME: Jorge Hi   :  1940  MRM:  [id number]    Date of service: 3/23/2025  10:28 AM       Assessment and Plan:   Syncope.  Unclear cause.  Patient has pacemaker.  Evaluated by cardiology and plan for interrogation of the pacemaker.  Needs Holter monitor on discharge which can be arranged by his cardiologist, Dr. Gilligan     Encephalopathy/elevated ammonia level.  Unclear cau se. CT head is negative for any acute findings.  Continue lactulose.  Ammonia level is trending down.  Check MRI of the brain.    3.  HFrEF, now 38% EF with moderate global hypokinesis.  Continue Lasix 40 mg IV twice daily.  CT of the chest: No PE.  Moderate bilateral pleural effusion, cardiomegaly and mild pulmonary edema.  Cardiology is following.     4.   AVRIL on CKD stage III. Avoid nephrotoxic medications.  Improving.  Evaluated by nephrology.     5.  Chronic A-fib Status post PPM 5 years back and ablation.  Continue to hold Eliquis due to hematuria.  Not on rate controlling medication.  Cardiology is following     6.  CAD s/p CABG in .  On atorvastatin and Eliquis at home.      7.  Hypertension.  Patient is on valsartan and Bumex at home.     8.  GERD.  Continue Protonix     9.  Hyperlipidemia.  On atorvastatin, but it is on hold for now     10.  Hematuria.  Urine is clearing now.  Continue to hold Eliquis.  Evaluated by urology and no plan for intervention.  Outpatient follow-up with urology on discharge     11.  Sleep apnea, severe reported by family.  CPAP     12.  Alcohol use.  Alcohol level was low on admission.  Family stated that patient chronically drinks in excess.  Continue CIWA protocol.     13.  Debility: PT/OT evaluation    14.  coronavirus HKU1 +.  Symptomatic treatment         Subjective:     Chief Complaint:: Patient was seen and examined as a follow up for encephalopathy.    Home w/Family           ___________________________________________________    Attending Physician: Prasanth Gutierrez MD

## 2025-03-24 PROBLEM — R55 SYNCOPE: Status: ACTIVE | Noted: 2025-03-24

## 2025-03-24 PROBLEM — I50.23 ACUTE ON CHRONIC HFREF (HEART FAILURE WITH REDUCED EJECTION FRACTION) (HCC): Status: ACTIVE | Noted: 2025-03-24

## 2025-03-24 PROBLEM — Z95.0 HISTORY OF PACEMAKER: Status: ACTIVE | Noted: 2025-03-24

## 2025-03-24 LAB
ALBUMIN SERPL-MCNC: 2.7 G/DL (ref 3.5–5)
ALBUMIN/GLOB SERPL: 1 (ref 1.1–2.2)
ALP SERPL-CCNC: 151 U/L (ref 45–117)
ALT SERPL-CCNC: 46 U/L (ref 12–78)
AMMONIA PLAS-SCNC: 38 UMOL/L
ANION GAP SERPL CALC-SCNC: 9 MMOL/L (ref 2–12)
AST SERPL-CCNC: 31 U/L (ref 15–37)
BASOPHILS # BLD: 0.08 K/UL (ref 0–0.1)
BASOPHILS NFR BLD: 0.9 % (ref 0–1)
BILIRUB SERPL-MCNC: 2 MG/DL (ref 0.2–1)
BUN SERPL-MCNC: 27 MG/DL (ref 6–20)
BUN/CREAT SERPL: 17 (ref 12–20)
CALCIUM SERPL-MCNC: 8.7 MG/DL (ref 8.5–10.1)
CHLORIDE SERPL-SCNC: 110 MMOL/L (ref 97–108)
CO2 SERPL-SCNC: 21 MMOL/L (ref 21–32)
CREAT SERPL-MCNC: 1.6 MG/DL (ref 0.7–1.3)
DIFFERENTIAL METHOD BLD: ABNORMAL
EOSINOPHIL # BLD: 0.38 K/UL (ref 0–0.4)
EOSINOPHIL NFR BLD: 4.3 % (ref 0–7)
ERYTHROCYTE [DISTWIDTH] IN BLOOD BY AUTOMATED COUNT: 20.4 % (ref 11.5–14.5)
GLOBULIN SER CALC-MCNC: 2.6 G/DL (ref 2–4)
GLUCOSE SERPL-MCNC: 92 MG/DL (ref 65–100)
HCT VFR BLD AUTO: 35.6 % (ref 36.6–50.3)
HGB BLD-MCNC: 10.8 G/DL (ref 12.1–17)
IMM GRANULOCYTES # BLD AUTO: 0.03 K/UL (ref 0–0.04)
IMM GRANULOCYTES NFR BLD AUTO: 0.3 % (ref 0–0.5)
LYMPHOCYTES # BLD: 1.64 K/UL (ref 0.8–3.5)
LYMPHOCYTES NFR BLD: 18.4 % (ref 12–49)
MAGNESIUM SERPL-MCNC: 2 MG/DL (ref 1.6–2.4)
MCH RBC QN AUTO: 27.1 PG (ref 26–34)
MCHC RBC AUTO-ENTMCNC: 30.3 G/DL (ref 30–36.5)
MCV RBC AUTO: 89.2 FL (ref 80–99)
MONOCYTES # BLD: 0.9 K/UL (ref 0–1)
MONOCYTES NFR BLD: 10.1 % (ref 5–13)
NEUTS SEG # BLD: 5.87 K/UL (ref 1.8–8)
NEUTS SEG NFR BLD: 66 % (ref 32–75)
NRBC # BLD: 0 K/UL (ref 0–0.01)
NRBC BLD-RTO: 0 PER 100 WBC
PHOSPHATE SERPL-MCNC: 4 MG/DL (ref 2.6–4.7)
PLATELET # BLD AUTO: 268 K/UL (ref 150–400)
PMV BLD AUTO: 10.7 FL (ref 8.9–12.9)
POTASSIUM SERPL-SCNC: 3.4 MMOL/L (ref 3.5–5.1)
PROT SERPL-MCNC: 5.3 G/DL (ref 6.4–8.2)
RBC # BLD AUTO: 3.99 M/UL (ref 4.1–5.7)
RBC MORPH BLD: ABNORMAL
SODIUM SERPL-SCNC: 140 MMOL/L (ref 136–145)
WBC # BLD AUTO: 8.9 K/UL (ref 4.1–11.1)

## 2025-03-24 PROCEDURE — 82140 ASSAY OF AMMONIA: CPT

## 2025-03-24 PROCEDURE — APPSS30 APP SPLIT SHARED TIME 16-30 MINUTES: Performed by: NURSE PRACTITIONER

## 2025-03-24 PROCEDURE — 2500000003 HC RX 250 WO HCPCS: Performed by: HOSPITALIST

## 2025-03-24 PROCEDURE — 6360000002 HC RX W HCPCS: Performed by: STUDENT IN AN ORGANIZED HEALTH CARE EDUCATION/TRAINING PROGRAM

## 2025-03-24 PROCEDURE — 99232 SBSQ HOSP IP/OBS MODERATE 35: CPT | Performed by: INTERNAL MEDICINE

## 2025-03-24 PROCEDURE — 6370000000 HC RX 637 (ALT 250 FOR IP): Performed by: INTERNAL MEDICINE

## 2025-03-24 PROCEDURE — 94761 N-INVAS EAR/PLS OXIMETRY MLT: CPT

## 2025-03-24 PROCEDURE — 6370000000 HC RX 637 (ALT 250 FOR IP): Performed by: NURSE PRACTITIONER

## 2025-03-24 PROCEDURE — 84100 ASSAY OF PHOSPHORUS: CPT

## 2025-03-24 PROCEDURE — 1100000000 HC RM PRIVATE

## 2025-03-24 PROCEDURE — 6370000000 HC RX 637 (ALT 250 FOR IP): Performed by: STUDENT IN AN ORGANIZED HEALTH CARE EDUCATION/TRAINING PROGRAM

## 2025-03-24 PROCEDURE — 85025 COMPLETE CBC W/AUTO DIFF WBC: CPT

## 2025-03-24 PROCEDURE — 83735 ASSAY OF MAGNESIUM: CPT

## 2025-03-24 PROCEDURE — 80053 COMPREHEN METABOLIC PANEL: CPT

## 2025-03-24 PROCEDURE — 6370000000 HC RX 637 (ALT 250 FOR IP): Performed by: HOSPITALIST

## 2025-03-24 RX ADMIN — ZOLPIDEM TARTRATE 5 MG: 5 TABLET, COATED ORAL at 22:18

## 2025-03-24 RX ADMIN — ZOLPIDEM TARTRATE 5 MG: 5 TABLET, COATED ORAL at 00:11

## 2025-03-24 RX ADMIN — GUAIFENESIN 1200 MG: 600 TABLET ORAL at 09:29

## 2025-03-24 RX ADMIN — ACETAMINOPHEN 650 MG: 325 TABLET ORAL at 00:11

## 2025-03-24 RX ADMIN — SODIUM CHLORIDE, PRESERVATIVE FREE 10 ML: 5 INJECTION INTRAVENOUS at 20:37

## 2025-03-24 RX ADMIN — GUAIFENESIN 1200 MG: 600 TABLET ORAL at 20:37

## 2025-03-24 RX ADMIN — PYRIDOXINE HCL TAB 50 MG 100 MG: 50 TAB at 09:28

## 2025-03-24 RX ADMIN — FUROSEMIDE 40 MG: 10 INJECTION, SOLUTION INTRAMUSCULAR; INTRAVENOUS at 09:28

## 2025-03-24 RX ADMIN — POTASSIUM BICARBONATE 40 MEQ: 782 TABLET, EFFERVESCENT ORAL at 09:28

## 2025-03-24 RX ADMIN — LACTULOSE 20 G: 10 SOLUTION ORAL at 14:52

## 2025-03-24 RX ADMIN — FUROSEMIDE 40 MG: 10 INJECTION, SOLUTION INTRAMUSCULAR; INTRAVENOUS at 18:20

## 2025-03-24 RX ADMIN — FLUTICASONE PROPIONATE 2 SPRAY: 50 SPRAY, METERED NASAL at 09:29

## 2025-03-24 RX ADMIN — LACTULOSE 20 G: 10 SOLUTION ORAL at 20:15

## 2025-03-24 RX ADMIN — SODIUM CHLORIDE, PRESERVATIVE FREE 10 ML: 5 INJECTION INTRAVENOUS at 09:30

## 2025-03-24 ASSESSMENT — PAIN SCALES - GENERAL
PAINLEVEL_OUTOF10: 1
PAINLEVEL_OUTOF10: 4

## 2025-03-24 NOTE — CARE COORDINATION
Care Management Initial Assessment  3/24/2025 2:43 PM  If patient is discharged prior to next notation, then this note serves as note for discharge by case management.    Reason for Admission:   Encephalopathy [G93.40]  Serum total bilirubin elevated [R17]  Increased ammonia level [R79.89]  Chronic kidney disease, unspecified CKD stage [N18.9]  Encephalopathy, unspecified type [G93.40]         Patient Admission Status: Inpatient  Date Admitted to INP: 3/21/25  RUR: Readmission Risk Score: 16.8    Hospitalization in the last 30 days (Readmission):  No        Advance Care Planning:  Code Status: Full Code  Primary Healthcare Decision Maker: Legal Next of Kin   Advance Directive: has NO advanced directive - not interested in additional information     __________________________________________________________________________  Assessment:      03/24/25 1438   Service Assessment   Patient Orientation Alert and Oriented   Cognition Alert   History Provided By Patient;Child/Family   Primary Caregiver Self   Accompanied By/Relationship daughter   Support Systems Spouse/Significant Other;Children   Patient's Healthcare Decision Maker is: Legal Next of Kin   PCP Verified by CM Yes   Last Visit to PCP Within last 3 months   Prior Functional Level Independent in ADLs/IADLs   Current Functional Level Independent in ADLs/IADLs   Can patient return to prior living arrangement Unknown at present   Ability to make needs known: Good   Family able to assist with home care needs: No   Would you like for me to discuss the discharge plan with any other family members/significant others, and if so, who? Yes  (daughter or wife)   Financial Resources Medicare   Community Resources None   Social/Functional History   Lives With Spouse   Type of Home Condo   Home Layout Two level   Home Access Stairs to enter with rails   Home Equipment None   Prior Level of Assist for ADLs Independent   Prior Level of Assist for Homemaking Needs

## 2025-03-24 NOTE — PROGRESS NOTES
History    Not on file   Tobacco Use    Smoking status: Former     Current packs/day: 0.00     Types: Cigarettes     Quit date: 1964     Years since quittin.2    Smokeless tobacco: Never   Substance and Sexual Activity    Alcohol use: Yes     Alcohol/week: 3.0 standard drinks of alcohol    Drug use: No    Sexual activity: Not on file   Other Topics Concern    Not on file   Social History Narrative    Not on file     Social Drivers of Health     Financial Resource Strain: Not on file   Food Insecurity: Food Insecurity Present (3/22/2025)    Hunger Vital Sign     Worried About Running Out of Food in the Last Year: Sometimes true     Ran Out of Food in the Last Year: Sometimes true   Transportation Needs: Unmet Transportation Needs (3/22/2025)    PRAPARE - Transportation     Lack of Transportation (Medical): Yes     Lack of Transportation (Non-Medical): Yes   Physical Activity: Not on file   Stress: Not on file   Social Connections: Not on file   Intimate Partner Violence: Not on file   Housing Stability: Low Risk  (3/22/2025)    Housing Stability Vital Sign     Unable to Pay for Housing in the Last Year: No     Number of Times Moved in the Last Year: 0     Homeless in the Last Year: No      No family history on file.   Prior to Admission medications    Medication Sig Start Date End Date Taking? Authorizing Provider   aspirin 81 MG EC tablet Take 1 tablet by mouth daily 24   Prasanth Gutierrez MD   apixaban (ELIQUIS) 2.5 MG TABS tablet Take 1 tablet by mouth 2 times daily    Ángel Bunch MD   bumetanide (BUMEX) 0.5 MG tablet Take 1 tablet by mouth daily 3/2/23   Ángel Bunch MD   acetaminophen (TYLENOL) 325 MG tablet Take 2 tablets by mouth every 6 hours as needed for Pain 13   Ángel Bunch MD   fluticasone (FLONASE) 50 MCG/ACT nasal spray 2 sprays by Nasal route daily 22   Ángel Bunch MD   valsartan (DIOVAN) 40 MG tablet Take 1 tablet by mouth daily     110 (H) 97 - 108 mmol/L    CO2 22 21 - 32 mmol/L    Anion Gap 7 2 - 12 mmol/L    Glucose 101 (H) 65 - 100 mg/dL    BUN 31 (H) 6 - 20 MG/DL    Creatinine 1.59 (H) 0.70 - 1.30 MG/DL    BUN/Creatinine Ratio 19 12 - 20      Est, Glom Filt Rate 42 (L) >60 ml/min/1.73m2    Calcium 8.5 8.5 - 10.1 MG/DL    Total Bilirubin 1.6 (H) 0.2 - 1.0 MG/DL    ALT 50 12 - 78 U/L    AST 31 15 - 37 U/L    Alk Phosphatase 146 (H) 45 - 117 U/L    Total Protein 5.5 (L) 6.4 - 8.2 g/dL    Albumin 2.6 (L) 3.5 - 5.0 g/dL    Globulin 2.9 2.0 - 4.0 g/dL    Albumin/Globulin Ratio 0.9 (L) 1.1 - 2.2     CBC with Auto Differential    Collection Time: 03/23/25  2:34 AM   Result Value Ref Range    WBC 8.4 4.1 - 11.1 K/uL    RBC 4.01 (L) 4.10 - 5.70 M/uL    Hemoglobin 10.9 (L) 12.1 - 17.0 g/dL    Hematocrit 36.0 (L) 36.6 - 50.3 %    MCV 89.8 80.0 - 99.0 FL    MCH 27.2 26.0 - 34.0 PG    MCHC 30.3 30.0 - 36.5 g/dL    RDW 20.6 (H) 11.5 - 14.5 %    Platelets 254 150 - 400 K/uL    MPV 10.2 8.9 - 12.9 FL    Nucleated RBCs 0.0 0  WBC    nRBC 0.00 0.00 - 0.01 K/uL    Neutrophils % 68.4 32.0 - 75.0 %    Lymphocytes % 16.4 12.0 - 49.0 %    Monocytes % 9.7 5.0 - 13.0 %    Eosinophils % 4.3 0.0 - 7.0 %    Basophils % 0.8 0.0 - 1.0 %    Immature Granulocytes % 0.4 0.0 - 0.5 %    Neutrophils Absolute 5.75 1.80 - 8.00 K/UL    Lymphocytes Absolute 1.38 0.80 - 3.50 K/UL    Monocytes Absolute 0.81 0.00 - 1.00 K/UL    Eosinophils Absolute 0.36 0.00 - 0.40 K/UL    Basophils Absolute 0.07 0.00 - 0.10 K/UL    Immature Granulocytes Absolute 0.03 0.00 - 0.04 K/UL    Differential Type SMEAR SCANNED      RBC Comment ANISOCYTOSIS  2+       Magnesium    Collection Time: 03/23/25  2:34 AM   Result Value Ref Range    Magnesium 2.1 1.6 - 2.4 mg/dL   Phosphorus    Collection Time: 03/23/25  2:34 AM   Result Value Ref Range    Phosphorus 3.3 2.6 - 4.7 MG/DL   Ammonia    Collection Time: 03/24/25  2:46 AM   Result Value Ref Range    Ammonia 38 (H) <32 UMOL/L   Comprehensive

## 2025-03-24 NOTE — PROGRESS NOTES
examined as a follow up for encephalopathy.  Chart was reviewed.  C/O shortness of breath is getting better    ROS:  (bold if positive, if negative)    Tolerating PT  Tolerating Diet     v   Objective:     Last 24hrs VS reviewed since prior progress note. Most recent are:    Vitals:    03/24/25 0803   BP: 106/82   Pulse: 78   Resp: 17   Temp: 97.9 °F (36.6 °C)   SpO2: 98%     SpO2 Readings from Last 6 Encounters:   03/24/25 98%   03/17/25 99%   06/16/24 98%   06/07/23 97%          Intake/Output Summary (Last 24 hours) at 3/24/2025 1209  Last data filed at 3/24/2025 0804  Gross per 24 hour   Intake --   Output 2600 ml   Net -2600 ml        Physical Exam:    Gen:  Well-developed, well-nourished, in no acute distress  HEENT:  Pink conjunctivae, PERRL, hearing intact to voice, moist mucous membranes  Neck:  Supple, without masses, thyroid non-tender  Resp:  No accessory muscle use,  rales   Card:  No murmurs, normal S1, S2 without thrills, bruits or peripheral edema  Abd:  Soft, non-tender, non-distended, normoactive bowel sounds are present, no palpable organomegaly and no detectable hernias  Lymph:  No cervical or inguinal adenopathy  Musc:  No cyanosis or clubbing  Skin:  No rashes or ulcers, skin turgor is good  Neuro:  Cranial nerves are grossly intact, no focal motor weakness, follows commands appropriately  Psych:  poor insight,    __________________________________________________________________  Medications Reviewed: (see below)  Medications:     Current Facility-Administered Medications   Medication Dose Route Frequency    zolpidem (AMBIEN) tablet 5 mg  5 mg Oral Nightly PRN    furosemide (LASIX) injection 40 mg  40 mg IntraVENous BID    benzonatate (TESSALON) capsule 100 mg  100 mg Oral TID PRN    lactulose (CHRONULAC) 10 GM/15ML solution 20 g  20 g Oral TID    ipratropium 0.5 mg-albuterol 2.5 mg (DUONEB) nebulizer solution 1 Dose  1 Dose Inhalation Once    guaiFENesin (MUCINEX) extended release tablet 1,200  mg  1,200 mg Oral BID    [Held by provider] apixaban (ELIQUIS) tablet 2.5 mg  2.5 mg Oral BID    [Held by provider] atorvastatin (LIPITOR) tablet 40 mg  40 mg Oral Nightly    fluticasone (FLONASE) 50 MCG/ACT nasal spray 2 spray  2 spray Nasal Daily    vitamin B-6 (PYRIDOXINE) tablet 100 mg  100 mg Oral Daily    sodium chloride flush 0.9 % injection 5-40 mL  5-40 mL IntraVENous 2 times per day    sodium chloride flush 0.9 % injection 5-40 mL  5-40 mL IntraVENous PRN    0.9 % sodium chloride infusion   IntraVENous PRN    ondansetron (ZOFRAN-ODT) disintegrating tablet 4 mg  4 mg Oral Q8H PRN    Or    ondansetron (ZOFRAN) injection 4 mg  4 mg IntraVENous Q6H PRN    polyethylene glycol (GLYCOLAX) packet 17 g  17 g Oral Daily PRN    acetaminophen (TYLENOL) tablet 650 mg  650 mg Oral Q6H PRN    Or    acetaminophen (TYLENOL) suppository 650 mg  650 mg Rectal Q6H PRN        Lab Data Reviewed: (see below)  Lab Review:     Recent Labs     03/22/25  0316 03/23/25  0234 03/24/25  0246   WBC 8.8 8.4 8.9   HGB 10.9* 10.9* 10.8*   HCT 36.6 36.0* 35.6*    254 268     Recent Labs     03/21/25  1422 03/22/25  0316 03/23/25  0234 03/24/25  0246    142 139 140   K 3.8 4.3 3.8 3.4*   * 113* 110* 110*   CO2 22 20* 22 21   BUN 41* 36* 31* 27*   MG  --   --  2.1 2.0   PHOS  --   --  3.3 4.0   ALT 69  --  50 46     No results found for: \"GLUCPOC\"  No results for input(s): \"PH\", \"PCO2\", \"PO2\", \"HCO3\", \"FIO2\" in the last 72 hours.  No results for input(s): \"INR\" in the last 72 hours.  [unfilled]    I have reviewed notes of prior 24hr.    Other pertinent lab:     Total time: -35- minutes. I personally saw and examined the patient during this time period.  Greater than 50% of this time was spent in counseling and coordination of care.    I personally reviewed chart, notes, data and current medications in the medical record.  I have personally examined and treated the patient at bedside during this period.

## 2025-03-24 NOTE — PROGRESS NOTES
[facility] CARDIOLOGY                    Cardiology Care Note     []Initial Encounter     [x]Follow-up    Patient Name: Jorge Hi - :1940 - MRN:[id number]  Primary Cardiologist: Dr. Gilligan and Dr. Yan Delgado.   Consulting Cardiologist: Milind Luu MD     Reason for encounter: CHF    HPI:   Jorge Hi is a 85 y.o. male with PMH significant for CAD, CABG, stents, atrial fibrillation status post ablation, who is here for an episode of syncope that occurred yesterday evening when he was having dinner with his daughter.  His daughter saw him get & nearly passed out but did not completely lose consciousness.  2 members of his family helped him get into a car and they brought him over here to the ER.  He did not have any symptoms of chest pain, palpitations.  He has been having these episodes from last few weeks.  He has seen his cardiologist in January and no changes were made.  In 2024 he underwent a stent by his cardiologist.  EKG at presentation demonstrated atrial fibrillation with heart rate of 82 bpm with PVCs and left axis deviation with left anterior fascicular block and right bundle branch block.        Most recent HS troponins:  Recent Labs     25  1422   TROPHS 75        Assessment and Plan     1.  Pre-syncope, AMS: Unclear if this is cardiac in nature  - He has a pacemaker therefore bradyarrthymias cannot be the cause, interrogated and functioning normally without any arrhythmias  - His blood pressure has been normal therefore cannot explain if there is any other cardiac cause for syncope  - LVEF is 38%  - Dr Luu had recommend Holter monitor, can be arranged through his primary cardiologist     2. Acute on chronic CHF  - Has large b/l pleural effusions  - LVEF is 38%. Last EF in records was 50-55% in    - cont IV lasix 40 mg bid  - may need thoracentesis     3. History of CAD/CABG  - s/p PCI to prox ramus performed in 2024  - patent LIMA to LAD, SVG to RCA,  ----- Message from Rosa Gomes MD sent at 5/15/2019  2:20 PM CDT -----  Order additional testing

## 2025-03-25 ENCOUNTER — APPOINTMENT (OUTPATIENT)
Facility: HOSPITAL | Age: 85
DRG: 071 | End: 2025-03-25
Attending: INTERNAL MEDICINE
Payer: MEDICARE

## 2025-03-25 VITALS
HEART RATE: 85 BPM | DIASTOLIC BLOOD PRESSURE: 71 MMHG | WEIGHT: 176.6 LBS | HEIGHT: 70 IN | TEMPERATURE: 97.9 F | RESPIRATION RATE: 20 BRPM | OXYGEN SATURATION: 98 % | BODY MASS INDEX: 25.28 KG/M2 | SYSTOLIC BLOOD PRESSURE: 108 MMHG

## 2025-03-25 LAB
ALBUMIN SERPL-MCNC: 2.8 G/DL (ref 3.5–5)
ALBUMIN/GLOB SERPL: 1 (ref 1.1–2.2)
ALP SERPL-CCNC: 155 U/L (ref 45–117)
ALT SERPL-CCNC: 46 U/L (ref 12–78)
ANION GAP SERPL CALC-SCNC: 7 MMOL/L (ref 2–12)
ANION GAP SERPL CALC-SCNC: 8 MMOL/L (ref 2–12)
AST SERPL-CCNC: 35 U/L (ref 15–37)
BASOPHILS # BLD: 0.07 K/UL (ref 0–0.1)
BASOPHILS # BLD: 0.08 K/UL (ref 0–0.1)
BASOPHILS NFR BLD: 0.8 % (ref 0–1)
BASOPHILS NFR BLD: 0.9 % (ref 0–1)
BILIRUB SERPL-MCNC: 1.4 MG/DL (ref 0.2–1)
BUN SERPL-MCNC: 23 MG/DL (ref 6–20)
BUN SERPL-MCNC: 24 MG/DL (ref 6–20)
BUN/CREAT SERPL: 14 (ref 12–20)
BUN/CREAT SERPL: 14 (ref 12–20)
CALCIUM SERPL-MCNC: 8.4 MG/DL (ref 8.5–10.1)
CALCIUM SERPL-MCNC: 8.7 MG/DL (ref 8.5–10.1)
CHLORIDE SERPL-SCNC: 106 MMOL/L (ref 97–108)
CHLORIDE SERPL-SCNC: 106 MMOL/L (ref 97–108)
CO2 SERPL-SCNC: 25 MMOL/L (ref 21–32)
CO2 SERPL-SCNC: 26 MMOL/L (ref 21–32)
CREAT SERPL-MCNC: 1.66 MG/DL (ref 0.7–1.3)
CREAT SERPL-MCNC: 1.68 MG/DL (ref 0.7–1.3)
DIFFERENTIAL METHOD BLD: ABNORMAL
DIFFERENTIAL METHOD BLD: ABNORMAL
ECHO BSA: 2.06 M2
EOSINOPHIL # BLD: 0.34 K/UL (ref 0–0.4)
EOSINOPHIL # BLD: 0.42 K/UL (ref 0–0.4)
EOSINOPHIL NFR BLD: 4.1 % (ref 0–7)
EOSINOPHIL NFR BLD: 4.5 % (ref 0–7)
ERYTHROCYTE [DISTWIDTH] IN BLOOD BY AUTOMATED COUNT: 20.1 % (ref 11.5–14.5)
ERYTHROCYTE [DISTWIDTH] IN BLOOD BY AUTOMATED COUNT: 20.3 % (ref 11.5–14.5)
GLOBULIN SER CALC-MCNC: 2.8 G/DL (ref 2–4)
GLUCOSE SERPL-MCNC: 81 MG/DL (ref 65–100)
GLUCOSE SERPL-MCNC: 93 MG/DL (ref 65–100)
HCT VFR BLD AUTO: 36.2 % (ref 36.6–50.3)
HCT VFR BLD AUTO: 38.7 % (ref 36.6–50.3)
HGB BLD-MCNC: 11.1 G/DL (ref 12.1–17)
HGB BLD-MCNC: 11.7 G/DL (ref 12.1–17)
IMM GRANULOCYTES # BLD AUTO: 0.02 K/UL (ref 0–0.04)
IMM GRANULOCYTES # BLD AUTO: 0.03 K/UL (ref 0–0.04)
IMM GRANULOCYTES NFR BLD AUTO: 0.2 % (ref 0–0.5)
IMM GRANULOCYTES NFR BLD AUTO: 0.4 % (ref 0–0.5)
LYMPHOCYTES # BLD: 1.74 K/UL (ref 0.8–3.5)
LYMPHOCYTES # BLD: 1.93 K/UL (ref 0.8–3.5)
LYMPHOCYTES NFR BLD: 20.7 % (ref 12–49)
LYMPHOCYTES NFR BLD: 20.7 % (ref 12–49)
MAGNESIUM SERPL-MCNC: 2 MG/DL (ref 1.6–2.4)
MCH RBC QN AUTO: 27 PG (ref 26–34)
MCH RBC QN AUTO: 27 PG (ref 26–34)
MCHC RBC AUTO-ENTMCNC: 30.2 G/DL (ref 30–36.5)
MCHC RBC AUTO-ENTMCNC: 30.7 G/DL (ref 30–36.5)
MCV RBC AUTO: 88.1 FL (ref 80–99)
MCV RBC AUTO: 89.4 FL (ref 80–99)
MONOCYTES # BLD: 0.76 K/UL (ref 0–1)
MONOCYTES # BLD: 0.81 K/UL (ref 0–1)
MONOCYTES NFR BLD: 8.7 % (ref 5–13)
MONOCYTES NFR BLD: 9 % (ref 5–13)
NEUTS SEG # BLD: 5.46 K/UL (ref 1.8–8)
NEUTS SEG # BLD: 6.06 K/UL (ref 1.8–8)
NEUTS SEG NFR BLD: 65 % (ref 32–75)
NEUTS SEG NFR BLD: 65 % (ref 32–75)
NRBC # BLD: 0 K/UL (ref 0–0.01)
NRBC # BLD: 0 K/UL (ref 0–0.01)
NRBC BLD-RTO: 0 PER 100 WBC
NRBC BLD-RTO: 0 PER 100 WBC
PHOSPHATE SERPL-MCNC: 3.6 MG/DL (ref 2.6–4.7)
PLATELET # BLD AUTO: 281 K/UL (ref 150–400)
PLATELET # BLD AUTO: 284 K/UL (ref 150–400)
PMV BLD AUTO: 10.6 FL (ref 8.9–12.9)
PMV BLD AUTO: 10.6 FL (ref 8.9–12.9)
POTASSIUM SERPL-SCNC: 3.8 MMOL/L (ref 3.5–5.1)
POTASSIUM SERPL-SCNC: 3.9 MMOL/L (ref 3.5–5.1)
PROT SERPL-MCNC: 5.6 G/DL (ref 6.4–8.2)
RBC # BLD AUTO: 4.11 M/UL (ref 4.1–5.7)
RBC # BLD AUTO: 4.33 M/UL (ref 4.1–5.7)
RBC MORPH BLD: ABNORMAL
RBC MORPH BLD: ABNORMAL
SODIUM SERPL-SCNC: 139 MMOL/L (ref 136–145)
SODIUM SERPL-SCNC: 139 MMOL/L (ref 136–145)
WBC # BLD AUTO: 8.4 K/UL (ref 4.1–11.1)
WBC # BLD AUTO: 9.3 K/UL (ref 4.1–11.1)

## 2025-03-25 PROCEDURE — 94761 N-INVAS EAR/PLS OXIMETRY MLT: CPT

## 2025-03-25 PROCEDURE — 84100 ASSAY OF PHOSPHORUS: CPT

## 2025-03-25 PROCEDURE — 83735 ASSAY OF MAGNESIUM: CPT

## 2025-03-25 PROCEDURE — 6370000000 HC RX 637 (ALT 250 FOR IP): Performed by: STUDENT IN AN ORGANIZED HEALTH CARE EDUCATION/TRAINING PROGRAM

## 2025-03-25 PROCEDURE — 93246 EXT ECG>7D<15D RECORDING: CPT

## 2025-03-25 PROCEDURE — 6370000000 HC RX 637 (ALT 250 FOR IP): Performed by: HOSPITALIST

## 2025-03-25 PROCEDURE — 80053 COMPREHEN METABOLIC PANEL: CPT

## 2025-03-25 PROCEDURE — 36415 COLL VENOUS BLD VENIPUNCTURE: CPT

## 2025-03-25 PROCEDURE — 85025 COMPLETE CBC W/AUTO DIFF WBC: CPT

## 2025-03-25 RX ORDER — BUMETANIDE 1 MG/1
1 TABLET ORAL DAILY
Qty: 30 TABLET | Refills: 1 | Status: SHIPPED | OUTPATIENT
Start: 2025-03-25

## 2025-03-25 RX ADMIN — LACTULOSE 20 G: 10 SOLUTION ORAL at 08:39

## 2025-03-25 RX ADMIN — GUAIFENESIN 1200 MG: 600 TABLET ORAL at 08:38

## 2025-03-25 RX ADMIN — PYRIDOXINE HCL TAB 50 MG 100 MG: 50 TAB at 08:38

## 2025-03-25 NOTE — DISCHARGE SUMMARY
Hospitalist Discharge Summary     Patient ID:    Jorge Hi  [id number]  85 y.o.  1940    Admit date of service: 3/21/2025    Discharge date of service: 3/25/2025    Admission Diagnoses: Encephalopathy [G93.40]  Serum total bilirubin elevated [R17]  Increased ammonia level [R79.89]  Chronic kidney disease, unspecified CKD stage [N18.9]  Encephalopathy, unspecified type [G93.40]    Chronic Diagnoses:      Discharge Medications:   Current Discharge Medication List        CONTINUE these medications which have NOT CHANGED    Details   aspirin 81 MG EC tablet Take 1 tablet by mouth daily  Qty: 30 tablet, Refills: 3      apixaban (ELIQUIS) 2.5 MG TABS tablet Take 1 tablet by mouth 2 times daily      bumetanide (BUMEX) 0.5 MG tablet Take 1 tablet by mouth daily      acetaminophen (TYLENOL) 325 MG tablet Take 2 tablets by mouth every 6 hours as needed for Pain      fluticasone (FLONASE) 50 MCG/ACT nasal spray 2 sprays by Nasal route daily      valsartan (DIOVAN) 40 MG tablet Take 1 tablet by mouth daily      atorvastatin (LIPITOR) 40 MG tablet Take 1 tablet by mouth      lidocaine (LIDODERM) 5 % Apply patch to the affected area for 12 hours a day and remove for 12 hours a day.      ondansetron (ZOFRAN-ODT) 4 MG disintegrating tablet Take 1 tablet by mouth every 8 hours as needed      pyridoxine (B-6) 100 MG tablet Take 1 tablet by mouth daily      senna-docusate (PERICOLACE) 8.6-50 MG per tablet Take 1 tablet by mouth 2 times daily      zolpidem (AMBIEN) 10 MG tablet Take 1 tablet by mouth.             Follow up Care:    1. No follow-up provider specified. in 1-2 weeks  2. Cardiology     Diet:  cardiac diet    Disposition:  Home.    Advanced Directive:    Discharge Exam:  See today's note.    CONSULTATIONS: cardiology    Significant Diagnostic Studies:   Recent Labs     03/25/25  0237 03/25/25  0404   WBC 8.4 9.3   HGB 11.1* 11.7*   HCT 36.2* 38.7    281     Recent Labs     03/23/25  0234 03/24/25  0246  03/25/25  0237 03/25/25  0404    140 139 139   K 3.8 3.4* 3.8 3.9   * 110* 106 106   CO2 22 21 25 26   BUN 31* 27* 23* 24*   MG 2.1 2.0 2.0  --    PHOS 3.3 4.0 3.6  --      Recent Labs     03/23/25  0234 03/24/25  0246 03/25/25  0237   ALT 50 46 46   GLOB 2.9 2.6 2.8     No results for input(s): \"INR\", \"APTT\" in the last 72 hours.    Invalid input(s): \"PTP\"   No results for input(s): \"TIBC\" in the last 72 hours.    Invalid input(s): \"FE\", \"PSAT\", \"FERR\"   No results for input(s): \"PH\", \"PCO2\", \"PO2\" in the last 72 hours.  No results for input(s): \"CPK\", \"CKMB\", \"TROPONINI\" in the last 72 hours.  No results found for: \"GLUCPOC\"    Physical Exam:    Gen:  Well-developed, well-nourished, in no acute distress  HEENT:  Pink conjunctivae, PERRL, hearing intact to voice, moist mucous membranes  Neck:  Supple, without masses, thyroid non-tender  Resp:  No accessory muscle use, clear breath sounds without wheezes rales or rhonchi  Card:  No murmurs, normal S1, S2 without thrills, bruits or peripheral edema  Abd:  Soft, non-tender, non-distended, normoactive bowel sounds are present, no palpable organomegaly and no detectable hernias  Lymph:  No cervical or inguinal adenopathy  Musc:  No cyanosis or clubbing  Skin:  No rashes or ulcers, skin turgor is good  Neuro:  Cranial nerves are grossly intact, no focal motor weakness, follows commands appropriately  Psych:  Good insight, oriented to person, place and time, alert      HOSPITAL COURSE & TREATMENT RENDERED:   Syncope.  Unclear cause.  Patient has pacemaker.  Evaluated by cardiology and plan for interrogation of the pacemaker.  Needs Holter monitor on discharge which can be arranged by his cardiologist, Dr. Gilligan     Encephalopathy/elevated ammonia level.  Unclear cau se. CT head is negative for any acute findings.  Continue lactulose.  Ammonia level is trending down.  Check MRI of the brain.  Resolved      3.  HFrEF, now 38% EF with moderate global

## 2025-03-25 NOTE — PROGRESS NOTES
on file   Occupational History    Not on file   Tobacco Use    Smoking status: Former     Current packs/day: 0.00     Types: Cigarettes     Quit date: 1964     Years since quittin.2    Smokeless tobacco: Never   Substance and Sexual Activity    Alcohol use: Yes     Alcohol/week: 3.0 standard drinks of alcohol    Drug use: No    Sexual activity: Not on file   Other Topics Concern    Not on file   Social History Narrative    Not on file     Social Drivers of Health     Financial Resource Strain: Not on file   Food Insecurity: Food Insecurity Present (3/22/2025)    Hunger Vital Sign     Worried About Running Out of Food in the Last Year: Sometimes true     Ran Out of Food in the Last Year: Sometimes true   Transportation Needs: Unmet Transportation Needs (3/22/2025)    PRAPARE - Transportation     Lack of Transportation (Medical): Yes     Lack of Transportation (Non-Medical): Yes   Physical Activity: Not on file   Stress: Not on file   Social Connections: Not on file   Intimate Partner Violence: Not on file   Housing Stability: Low Risk  (3/22/2025)    Housing Stability Vital Sign     Unable to Pay for Housing in the Last Year: No     Number of Times Moved in the Last Year: 0     Homeless in the Last Year: No      No family history on file.   Prior to Admission medications    Medication Sig Start Date End Date Taking? Authorizing Provider   bumetanide (BUMEX) 1 MG tablet Take 1 tablet by mouth daily 3/25/25  Yes Prasanth Gutierrez MD   aspirin 81 MG EC tablet Take 1 tablet by mouth daily 24   Prasanth Gutierrez MD   apixaban (ELIQUIS) 2.5 MG TABS tablet Take 1 tablet by mouth 2 times daily    ProviderÁngel MD   acetaminophen (TYLENOL) 325 MG tablet Take 2 tablets by mouth every 6 hours as needed for Pain 13   Ángel Bunch MD   fluticasone (FLONASE) 50 MCG/ACT nasal spray 2 sprays by Nasal route daily 22   Ángel Bunch MD   valsartan (DIOVAN) 40 MG tablet Take 1 tablet by  Value Ref Range    Sodium 139 136 - 145 mmol/L    Potassium 3.9 3.5 - 5.1 mmol/L    Chloride 106 97 - 108 mmol/L    CO2 26 21 - 32 mmol/L    Anion Gap 7 2 - 12 mmol/L    Glucose 81 65 - 100 mg/dL    BUN 24 (H) 6 - 20 MG/DL    Creatinine 1.68 (H) 0.70 - 1.30 MG/DL    BUN/Creatinine Ratio 14 12 - 20      Est, Glom Filt Rate 40 (L) >60 ml/min/1.73m2    Calcium 8.7 8.5 - 10.1 MG/DL   CBC with Auto Differential    Collection Time: 03/25/25  4:04 AM   Result Value Ref Range    WBC 9.3 4.1 - 11.1 K/uL    RBC 4.33 4.10 - 5.70 M/uL    Hemoglobin 11.7 (L) 12.1 - 17.0 g/dL    Hematocrit 38.7 36.6 - 50.3 %    MCV 89.4 80.0 - 99.0 FL    MCH 27.0 26.0 - 34.0 PG    MCHC 30.2 30.0 - 36.5 g/dL    RDW 20.3 (H) 11.5 - 14.5 %    Platelets 281 150 - 400 K/uL    MPV 10.6 8.9 - 12.9 FL    Nucleated RBCs 0.0 0  WBC    nRBC 0.00 0.00 - 0.01 K/uL    Neutrophils % 65.0 32.0 - 75.0 %    Lymphocytes % 20.7 12.0 - 49.0 %    Monocytes % 8.7 5.0 - 13.0 %    Eosinophils % 4.5 0.0 - 7.0 %    Basophils % 0.9 0.0 - 1.0 %    Immature Granulocytes % 0.2 0.0 - 0.5 %    Neutrophils Absolute 6.06 1.80 - 8.00 K/UL    Lymphocytes Absolute 1.93 0.80 - 3.50 K/UL    Monocytes Absolute 0.81 0.00 - 1.00 K/UL    Eosinophils Absolute 0.42 (H) 0.00 - 0.40 K/UL    Basophils Absolute 0.08 0.00 - 0.10 K/UL    Immature Granulocytes Absolute 0.02 0.00 - 0.04 K/UL    Differential Type AUTOMATED         Discussed with:    Family  Thank you so much to allow us to participate in this patient's care.We will follow.  : Zack George MD  3/25/2025        [place] office:  [address] [place] [address]  Phone - [phone]  Fax - [phone]

## 2025-03-25 NOTE — DISCHARGE INSTRUCTIONS
HOSPITALIST DISCHARGE INSTRUCTIONS  NAME:  Jorge Hi   :  1940   MRN:  [id number]     Date/Time:  3/25/2025 10:08 AM    ADMIT DATE: 3/21/2025     DISCHARGE DATE: 3/25/2025     DISCHARGE DIAGNOSIS:  syncope    DISCHARGE INSTRUCTIONS:  Thank you for allowing us to participate in your care. Your discharging Hospitalist is Prasanth Gutierrez MD. You were admitted for evaluation and treatment of the above.        MEDICATIONS:    It is important that you take the medication exactly as they are prescribed.   Keep your medication in the bottles provided by the pharmacist and keep a list of the medication names, dosages, and times to be taken in your wallet.   Do not take other medications without consulting your doctor.             If you experience any of the following symptoms then please call your primary care physician or return to the emergency room if you cannot get hold of your doctor:  Fever, chills, nausea, vomiting, diarrhea, change in mentation, falling, bleeding, shortness of breath    Follow Up:  Please call the below provider to arrange hospital follow up appointment    Follow up with cardiology  Follow up with nephrology, Dr George [phone]   Follow up with [facility]     For questions regarding your Hospitalization or to contact the Hospital Medicine team, please call [phone].      Information obtained by :  I understand that if any problems occur once I am at home I am to contact my physician.    I understand and acknowledge receipt of the instructions indicated above.                                                                                                                                           Physician's or R.N.'s Signature                                                                  Date/Time                                                                                                                                              Patient or Representative

## 2025-03-25 NOTE — PLAN OF CARE
Problem: Safety - Adult  Goal: Free from fall injury  Outcome: Progressing     Problem: Chronic Conditions and Co-morbidities  Goal: Patient's chronic conditions and co-morbidity symptoms are monitored and maintained or improved  Outcome: Progressing  Flowsheets (Taken 3/24/2025 2045)  Care Plan - Patient's Chronic Conditions and Co-Morbidity Symptoms are Monitored and Maintained or Improved: Monitor and assess patient's chronic conditions and comorbid symptoms for stability, deterioration, or improvement     Problem: Skin/Tissue Integrity  Goal: Skin integrity remains intact  Description: 1.  Monitor for areas of redness and/or skin breakdown  2.  Assess vascular access sites hourly  3.  Every 4-6 hours minimum:  Change oxygen saturation probe site  4.  Every 4-6 hours:  If on nasal continuous positive airway pressure, respiratory therapy assess nares and determine need for appliance change or resting period  Outcome: Progressing  Flowsheets (Taken 3/24/2025 2045)  Skin Integrity Remains Intact: Monitor for areas of redness and/or skin breakdown     Problem: ABCDS Injury Assessment  Goal: Absence of physical injury  Outcome: Progressing     Problem: Discharge Planning  Goal: Discharge to home or other facility with appropriate resources  Outcome: Progressing  Flowsheets (Taken 3/24/2025 2045)  Discharge to home or other facility with appropriate resources:   Identify barriers to discharge with patient and caregiver   Identify discharge learning needs (meds, wound care, etc)   Pt transferred this night shift to med/ surg unit. Appears to be comfortable. VSS and no s/s respiratory distress, denies pain. Safety precautions in place.

## 2025-03-27 LAB
BACTERIA SPEC CULT: NORMAL
BACTERIA SPEC CULT: NORMAL
SERVICE CMNT-IMP: NORMAL
SERVICE CMNT-IMP: NORMAL

## 2025-03-31 NOTE — PROGRESS NOTES
Occupational Therapy EVALUATION/discharge  Patient: Nicole Byers (75 y.o. male)  Date: 3/13/2019  Primary Diagnosis: Osteoarthritis of right knee, unspecified osteoarthritis type [M17.11]  Osteoarthritis of right knee [M17.11]  Osteoarthritis of right knee, unspecified osteoarthritis type [M17.11]  Procedure(s) (LRB):  RIGHT CONVERSION TOTAL KNEE ARTHROPLASTY (Right) 1 Day Post-Op   Precautions:   Fall, WBAT(no flexion to operative knee >90 degrees)    ASSESSMENT:   Based on the objective data described below, the patient presents at supervision to mod I level with self-care and mobility. Pt lives with supportive wife and will have assistance as needed. Feel pt is safe to return home once medically cleared. Further skilled acute occupational therapy is not indicated at this time. Discharge Recommendations: None  Further Equipment Recommendations for Discharge: RW      SUBJECTIVE:   Patient stated I'm ready to go home.     OBJECTIVE DATA SUMMARY:   HISTORY:   Past Medical History:   Diagnosis Date    Arrhythmia     hx afib    Arthritis     Cancer ([facility] [address]     basal & squamous cell removed   chest & arms    Chronic pain     neck & shoulders    Coronary atherosclerosis of native coronary artery 2/15/2011    2 sets stents & CABG    Essential hypertension, benign 2/15/2011    GERD (gastroesophageal reflux disease)     Heart failure ([facility] [address]     Mixed hyperlipidemia 2/15/2011    Renal insufficiency 03/04/2019    Cr 1.57     Past Surgical History:   Procedure Laterality Date    ABDOMEN SURGERY PROC UNLISTED  2009    left inguinal hernia repair    CARDIAC SURG PROCEDURE UNLIST  2009    CABG    CARDIAC SURG PROCEDURE UNLIST  2003    stents x 4    CARDIAC SURG PROCEDURE UNLIST  2007    stents x 4    CARDIAC SURG PROCEDURE UNLIST  02/2018    ablation    CARDIAC SURG PROCEDURE UNLIST  06/2018    ablation for afib    HX HEENT  02/2018    cataract surgery bilat c no lens implnts    HX HEENT  03/2018    laser surgery to correct visual problems after caratact surgery    HX ORTHOPAEDIC Bilateral 2007    partial knee replacemets bilat    HX PACEMAKER  02/14/2019    pacemaker inserted    HX UROLOGICAL      vasectomy       Prior Level of Function/Environment/Context: independent, very active, plays golf  Occupations in which the patient is/was successful, what are the barriers preventing that success:   Performance Patterns (routines, roles, habits, and rituals):   Personal Interests and/or values:   Expanded or extensive additional review of patient history:     Home Situation  Home Environment: Private residence  # Steps to Enter: 0  One/Two Story Residence: Two story  # of Interior Steps: 12  Interior Rails: Both  Lift Chair Available: No  Living Alone: No  Support Systems: Spouse/Significant Other/Partner  Patient Expects to be Discharged to[de-identified] Private residence  Current DME Used/Available at Home: None        EXAMINATION OF PERFORMANCE DEFICITS:  Cognitive/Behavioral Status:  Neurologic State: Alert  Orientation Level: Oriented X4  Cognition: Appropriate decision making        Safety/Judgement: Awareness of environment    Skin: intact    Edema: none noted    Hearing: Auditory  Auditory Impairment: Hard of hearing, bilateral, Hearing aid(s)(at home)  Hearing Aids/Status: At home    Vision/Perceptual:                                     Range of Motion:    AROM: Within functional limits                         Strength:    Strength: Within functional limits                Coordination: Tone & Sensation:                              Balance:  Sitting: Intact  Standing: Intact; With support    Functional Mobility and Transfers for ADLs:  Bed Mobility:  Supine to Sit: Modified independent  Scooting: Modified independent    Transfers:  Sit to Stand: Supervision  Stand to Sit: Supervision  Bed to Chair: Supervision  Bathroom Mobility: Supervision/set up  Toilet Transfer : Supervision    ADL Assessment:  Feeding: Independent    Oral Facial Hygiene/Grooming: Independent    Bathing: Stand-by assistance    Upper Body Dressing: Independent    Lower Body Dressing: Modified independent; Additional time    Toileting: Supervision                ADL Intervention and task modifications:                    Cognitive Retraining  Safety/Judgement: Awareness of environment      Functional Measure:  Barthel Index:    Bathin  Bladder: 10  Bowels: 10  Groomin  Dressing: 10  Feeding: 10  Mobility: 10  Stairs: 0  Toilet Use: 10  Transfer (Bed to Chair and Back): 10  Total: 80/100        Percentage of impairment   0%   1-19%   20-39%   40-59%   60-79%   80-99%   100%   Barthel Score 0-100 100 99-80 79-60 59-40 20-39 1-19   0     The Barthel ADL Index: Guidelines  1. The index should be used as a record of what a patient does, not as a record of what a patient could do. 2. The main aim is to establish degree of independence from any help, physical or verbal, however minor and for whatever reason. 3. The need for supervision renders the patient not independent. 4. A patient's performance should be established using the best available evidence. Asking the patient, friends/relatives and nurses are the usual sources, but direct observation and common sense are also important. However direct testing is not needed. 5. Usually the patient's performance over the preceding 24-48 hours is important, but occasionally longer periods will be relevant. 6. Middle categories imply that the patient supplies over 50 per cent of the effort. 7. Use of aids to be independent is allowed. Isela Galvez, Barthel, D.W. (6061). Functional evaluation: the Barthel Index. [facility] (14)2. Sarah Beth Enciso bart Darin, CASSIJJERF, Sandra Payton., Sindhu, [facility] (). Measuring the change indisability after inpatient rehabilitation; comparison of the responsiveness of the Barthel Index and Functional [place] Measure.  Journal of Neurology, Neurosurgery, and Psychiatry, 664), 942-971. ELSA Pham, IRENE Lopez, & Tonio Villareal M.A. (2004.) Assessment of post-stroke quality of life in cost-effectiveness studies: The usefulness of the Barthel Index and the EuroQoL-5D. Quality of Life Research, 15, 995-70       Occupational Therapy Evaluation Charge Determination   History Examination Decision-Making   LOW Complexity : Brief history review  LOW Complexity : 1-3 performance deficits relating to physical, cognitive , or psychosocial skils that result in activity limitations and / or participation restrictions  LOW Complexity : No comorbidities that affect functional and no verbal or physical assistance needed to complete eval tasks       Based on the above components, the patient evaluation is determined to be of the following complexity level: LOW   Pain:  Pain Scale 1: Numeric (0 - 10)  Pain Intensity 1: 2  Pain Location 1: Knee  Pain Orientation 1: Right  Pain Description 1: Aching  Pain Intervention(s) 1: Medication (see MAR)  Activity Tolerance:   Good  Please refer to the flowsheet for vital signs taken during this treatment. After treatment:   [x]  Patient left in no apparent distress sitting up in chair  []  Patient left in no apparent distress in bed  [x]  Call bell left within reach  []  Nursing notified  [x]  Caregiver present  [x]  Chair alarm activated    COMMUNICATION/EDUCATION:   Communication/Collaboration:  [x]      Home safety education was provided and the patient/caregiver indicated understanding. [x]      Patient/family have participated as able and agree with findings and recommendations. []      Patient is unable to participate in plan of care at this time.   Findings and recommendations were discussed with: Physical Therapist and Registered Nurse    RYAN Wray/L  Time Calculation: 13 mins [Initial Consult] : an initial consult for [Other___] : [unfilled]

## 2025-04-11 LAB — ECHO BSA: 2.06 M2

## 2025-04-15 ENCOUNTER — RESULTS FOLLOW-UP (OUTPATIENT)
Age: 85
End: 2025-04-15

## 2025-04-16 ENCOUNTER — CLINICAL DOCUMENTATION (OUTPATIENT)
Age: 85
End: 2025-04-16

## 2025-04-16 NOTE — RESULT ENCOUNTER NOTE
Pls notify>>> Holter results show- 100% Afib burden. Highest .  He may need low dose Toprol XL  He follows with Dr. Yan Delgado of [facility]. Please send this holter results to their office.

## (undated) DEVICE — 4-PORT MANIFOLD: Brand: NEPTUNE 2

## (undated) DEVICE — Z DISCONTINUED USE 2744636  DRESSING AQUACEL 14 IN ALG W3.5XL14IN POLYUR FLM CVR W/ HYDRCOLL

## (undated) DEVICE — PADDING CST 6IN STERILE --

## (undated) DEVICE — BUR SURG HD L3MM DIA3MM RND FLUT REPL CARB LINVATEC

## (undated) DEVICE — HANDPIECE SET WITH COAXIAL HIGH FLOW TIP AND SUCTION TUBE: Brand: INTERPULSE

## (undated) DEVICE — SUTURE VCRL + SZ 1-0 L36IN ABSRB UD CTX 1/2 CIR TAPR PNT VCP977H

## (undated) DEVICE — STRYKER PERFORMANCE SERIES SAGITTAL BLADE: Brand: STRYKER PERFORMANCE SERIES

## (undated) DEVICE — SUTURE VCRL SZ 2-0 L36IN ABSRB UD L36MM CT-1 1/2 CIR J945H

## (undated) DEVICE — NDL PRT INJ NSAF BLNT 18GX1.5 --

## (undated) DEVICE — STERILE HOOK LOCK ELASTIC BANDAGE 6IN X 10 YARD: Brand: HOOK LOCK™

## (undated) DEVICE — 3M™ IOBAN™ 2 ANTIMICROBIAL INCISE DRAPE 6648EZ: Brand: IOBAN™ 2

## (undated) DEVICE — DUAL CUT SAGITTAL BLADE

## (undated) DEVICE — APPLICATOR BNDG 1MM ADH PREMIERPRO EXOFIN

## (undated) DEVICE — ZIMMER® STERILE DISPOSABLE TOURNIQUET CUFF WITH PROTECTIVE SLEEVE AND PLC, DUAL PORT, SINGLE BLADDER, 34 IN. (86 CM)

## (undated) DEVICE — STERILE POLYISOPRENE POWDER-FREE SURGICAL GLOVES WITH EMOLLIENT COATING: Brand: PROTEXIS

## (undated) DEVICE — LIGHT HANDLE: Brand: DEVON

## (undated) DEVICE — DRAPE,EXTREMITY,89X128,STERILE: Brand: MEDLINE

## (undated) DEVICE — GAUZE SPONGES,12 PLY: Brand: CURITY

## (undated) DEVICE — GOWN,PREVENTION PLUS,XLN/XL,ST,24/CS: Brand: MEDLINE

## (undated) DEVICE — CONTAINER,SPECIMEN,3OZ,OR STRL: Brand: MEDLINE

## (undated) DEVICE — 1010 S-DRAPE TOWEL DRAPE 10/BX: Brand: STERI-DRAPE™

## (undated) DEVICE — REM POLYHESIVE ADULT PATIENT RETURN ELECTRODE: Brand: VALLEYLAB

## (undated) DEVICE — T4 HOOD

## (undated) DEVICE — CEMENT MIXING SYSTEM WITH FEMORAL BREAKWAY NOZZLE: Brand: REVOLUTION

## (undated) DEVICE — KENDALL SCD EXPRESS SLEEVES, KNEE LENGTH, MEDIUM: Brand: KENDALL SCD

## (undated) DEVICE — SYR LR LCK 1ML GRAD NSAF 30ML --

## (undated) DEVICE — INFECTION CONTROL KIT SYS

## (undated) DEVICE — NEEDLE HYPO 18GA L1.5IN PNK S STL HUB POLYPR SHLD REG BVL

## (undated) DEVICE — Device

## (undated) DEVICE — SUTURE STRATAFIX SPRL SZ 1 L14IN ABSRB VLT L48CM CTX 1/2 SXPD2B405

## (undated) DEVICE — (D)PREP SKN CHLRAPRP APPL 26ML -- CONVERT TO ITEM 371833

## (undated) DEVICE — STERILE POLYISOPRENE POWDER-FREE SURGICAL GLOVES: Brand: PROTEXIS

## (undated) DEVICE — SUTURE MCRYL SZ 3-0 L27IN ABSRB UD L24MM PS-1 3/8 CIR PRIM Y936H

## (undated) DEVICE — PREP KIT PEEL PTCH POVIDONE IOD

## (undated) DEVICE — DEVON™ KNEE AND BODY STRAP 60" X 3" (1.5 M X 7.6 CM): Brand: DEVON

## (undated) DEVICE — SOLUTION IRRIG 3000ML 0.9% SOD CHL FLX CONT 0797208] ICU MEDICAL INC]